# Patient Record
Sex: MALE | Race: WHITE | NOT HISPANIC OR LATINO | URBAN - METROPOLITAN AREA
[De-identification: names, ages, dates, MRNs, and addresses within clinical notes are randomized per-mention and may not be internally consistent; named-entity substitution may affect disease eponyms.]

---

## 2020-08-04 ENCOUNTER — OFFICE VISIT (OUTPATIENT)
Dept: URBAN - METROPOLITAN AREA CLINIC 40 | Facility: CLINIC | Age: 80
End: 2020-08-04

## 2020-08-06 ENCOUNTER — WEB ENCOUNTER (OUTPATIENT)
Dept: URBAN - METROPOLITAN AREA CLINIC 40 | Facility: CLINIC | Age: 80
End: 2020-08-06

## 2020-08-06 ENCOUNTER — OFFICE VISIT (OUTPATIENT)
Dept: URBAN - METROPOLITAN AREA TELEHEALTH 2 | Facility: TELEHEALTH | Age: 80
End: 2020-08-06
Payer: MEDICARE

## 2020-08-06 DIAGNOSIS — K29.70 GASTRITIS: ICD-10-CM

## 2020-08-06 DIAGNOSIS — K21.9 GERD (GASTROESOPHAGEAL REFLUX DISEASE): ICD-10-CM

## 2020-08-06 DIAGNOSIS — R13.14 ESOPHAGEAL DYSPHAGIA: ICD-10-CM

## 2020-08-06 DIAGNOSIS — R14.0 BLOATING: ICD-10-CM

## 2020-08-06 DIAGNOSIS — K29.60 ADENOPAPILLOMATOSIS GASTRICA: ICD-10-CM

## 2020-08-06 DIAGNOSIS — R10.13 EPIGASTRIC PAIN: ICD-10-CM

## 2020-08-06 PROCEDURE — G8417 CALC BMI ABV UP PARAM F/U: HCPCS | Performed by: INTERNAL MEDICINE

## 2020-08-06 PROCEDURE — G8427 DOCREV CUR MEDS BY ELIG CLIN: HCPCS | Performed by: INTERNAL MEDICINE

## 2020-08-06 PROCEDURE — 1036F TOBACCO NON-USER: CPT | Performed by: INTERNAL MEDICINE

## 2020-08-06 PROCEDURE — 99213 OFFICE O/P EST LOW 20 MIN: CPT | Performed by: INTERNAL MEDICINE

## 2020-08-06 PROCEDURE — G9903 PT SCRN TBCO ID AS NON USER: HCPCS | Performed by: INTERNAL MEDICINE

## 2020-08-06 RX ORDER — ESOMEPRAZOLE MAGNESIUM 40 MG
TAKE 1 CAPSULE BY ORAL ROUTE DAILY FOR 90 DAYS CAPSULE,DELAYED RELEASE (ENTERIC COATED) ORAL 1
Qty: 90 | Refills: 3 | Status: ACTIVE | COMMUNITY
Start: 2019-10-25 | End: 2020-10-19

## 2020-08-06 RX ORDER — ESOMEPRAZOLE MAGNESIUM 40 MG
TAKE 1 CAPSULE BY ORAL ROUTE DAILY FOR 90 DAYS CAPSULE,DELAYED RELEASE (ENTERIC COATED) ORAL 1
Qty: 90 | Refills: 3
Start: 2019-10-25

## 2020-08-06 NOTE — HPI-TODAY'S VISIT:
Patient is feeling well on daily Nexium 20 mg. Had one recent episode of dysphagia to solid food, none since. Last EGD was 11/26/19, showing mild gastritis, dilated to 48F Marrero.. Normal Abdominal ultrasound. He does have pacemaker, on Xarelto.

## 2022-05-17 ENCOUNTER — LAB OUTSIDE AN ENCOUNTER (OUTPATIENT)
Dept: URBAN - METROPOLITAN AREA CLINIC 40 | Facility: CLINIC | Age: 82
End: 2022-05-17

## 2022-05-17 ENCOUNTER — OFFICE VISIT (OUTPATIENT)
Dept: URBAN - METROPOLITAN AREA CLINIC 40 | Facility: CLINIC | Age: 82
End: 2022-05-17
Payer: MEDICARE

## 2022-05-17 DIAGNOSIS — R13.14 ESOPHAGEAL DYSPHAGIA: ICD-10-CM

## 2022-05-17 DIAGNOSIS — K29.70 GASTRITIS: ICD-10-CM

## 2022-05-17 DIAGNOSIS — K21.9 GERD (GASTROESOPHAGEAL REFLUX DISEASE): ICD-10-CM

## 2022-05-17 DIAGNOSIS — D50.9 IRON DEFICIENCY ANEMIA, UNSPECIFIED IRON DEFICIENCY ANEMIA TYPE: ICD-10-CM

## 2022-05-17 DIAGNOSIS — R14.0 BLOATING: ICD-10-CM

## 2022-05-17 PROCEDURE — 99214 OFFICE O/P EST MOD 30 MIN: CPT | Performed by: INTERNAL MEDICINE

## 2022-05-17 RX ORDER — ESOMEPRAZOLE MAGNESIUM 40 MG
TAKE 1 CAPSULE BY ORAL ROUTE DAILY FOR 90 DAYS CAPSULE,DELAYED RELEASE (ENTERIC COATED) ORAL 1
Qty: 90 | Refills: 3 | Status: ACTIVE | COMMUNITY
Start: 2019-10-25

## 2022-05-17 RX ORDER — OLMESARTAN MEDOXOMIL 5 MG
AS DIRECTED TABLET ORAL
Status: ACTIVE | COMMUNITY

## 2022-05-17 NOTE — HPI-TODAY'S VISIT:
Patient is feeling well on daily Nexium 20 mg. Had one recent episode of dysphagia to solid food, none since. Last EGD was 11/26/19, showing mild gastritis, dilated to 48F Marrero.. Normal Abdominal ultrasound. He does have pacemaker, on Xarelto. Patient underwent his last colonoscopy on 8/4/2014 which was a screening exam sigmoid diverticulosis was found, the exam was otherwise unremarkable. Patient underwent lab work on 4/20/2022.  Serum ferritin was 18 serum iron was 50 TIBC 416 with a 12% saturation hematocrit 37.2 hemoglobin 12.6 WBC 5.7 Patient returns for follow-up on 5/17/2022.  He is again complaining of dysphagia to solid food, with food temporarily lodging in his retrosternal region.  He underwent his last EGD with esophageal dilation on 11/26/2019.  He denies any abdominal pain, his stools have been normal in consistency has had no unexplained weight loss recent lab work does show iron deficiency anemia with serum ferritin of 18 serum iron 50 TIBC 416 with a 12% saturation.  Hematocrit 37.2 hemoglobin 12.6.  Stool testing was Hemoccult negative.  Patient underwent his last screening colonoscopy on 8/4/2014 which showed only sigmoid diverticulosis.  He has been taking over-the-counter Nexium 20 mg daily which has been well controlling his reflux.  He does have a pacemaker and is on Xarelto, so procedures will need to be done at the hospital, and we will need to obtain cardiac clearance.

## 2022-08-02 ENCOUNTER — OFFICE VISIT (OUTPATIENT)
Dept: URBAN - METROPOLITAN AREA MEDICAL CENTER 9 | Facility: MEDICAL CENTER | Age: 82
End: 2022-08-02
Payer: MEDICARE

## 2022-08-02 DIAGNOSIS — Z12.11 COLON CANCER SCREENING: ICD-10-CM

## 2022-08-02 DIAGNOSIS — K22.89 DILATATION OF ESOPHAGUS: ICD-10-CM

## 2022-08-02 DIAGNOSIS — D12.5 ADENOMA OF SIGMOID COLON: ICD-10-CM

## 2022-08-02 DIAGNOSIS — R13.19 CERVICAL DYSPHAGIA: ICD-10-CM

## 2022-08-02 DIAGNOSIS — K29.50 ANTRAL GASTRITIS: ICD-10-CM

## 2022-08-02 DIAGNOSIS — K31.89 ACQUIRED DEFORMITY OF DUODENUM: ICD-10-CM

## 2022-08-02 DIAGNOSIS — D12.3 ADENOMA OF TRANSVERSE COLON: ICD-10-CM

## 2022-08-02 PROCEDURE — 43239 EGD BIOPSY SINGLE/MULTIPLE: CPT | Performed by: INTERNAL MEDICINE

## 2022-08-02 PROCEDURE — 45380 COLONOSCOPY AND BIOPSY: CPT | Performed by: INTERNAL MEDICINE

## 2022-08-02 PROCEDURE — 43249 ESOPH EGD DILATION <30 MM: CPT | Performed by: INTERNAL MEDICINE

## 2022-08-23 ENCOUNTER — OFFICE VISIT (OUTPATIENT)
Dept: URBAN - METROPOLITAN AREA CLINIC 40 | Facility: CLINIC | Age: 82
End: 2022-08-23
Payer: MEDICARE

## 2022-08-23 ENCOUNTER — OFFICE VISIT (OUTPATIENT)
Dept: URBAN - METROPOLITAN AREA CLINIC 40 | Facility: CLINIC | Age: 82
End: 2022-08-23

## 2022-08-23 VITALS
HEIGHT: 66 IN | WEIGHT: 194.8 LBS | BODY MASS INDEX: 31.31 KG/M2 | DIASTOLIC BLOOD PRESSURE: 72 MMHG | TEMPERATURE: 97.5 F | SYSTOLIC BLOOD PRESSURE: 142 MMHG | HEART RATE: 92 BPM

## 2022-08-23 DIAGNOSIS — D50.9 IRON DEFICIENCY ANEMIA, UNSPECIFIED IRON DEFICIENCY ANEMIA TYPE: ICD-10-CM

## 2022-08-23 DIAGNOSIS — D12.6 TUBULAR ADENOMA OF COLON: ICD-10-CM

## 2022-08-23 DIAGNOSIS — R13.14 ESOPHAGEAL DYSPHAGIA: ICD-10-CM

## 2022-08-23 DIAGNOSIS — K21.9 GERD (GASTROESOPHAGEAL REFLUX DISEASE): ICD-10-CM

## 2022-08-23 DIAGNOSIS — K29.70 GASTRITIS: ICD-10-CM

## 2022-08-23 DIAGNOSIS — R14.0 BLOATING: ICD-10-CM

## 2022-08-23 PROBLEM — 444898006: Status: ACTIVE | Noted: 2022-08-23

## 2022-08-23 PROCEDURE — 99213 OFFICE O/P EST LOW 20 MIN: CPT | Performed by: INTERNAL MEDICINE

## 2022-08-23 RX ORDER — OLMESARTAN MEDOXOMIL 5 MG
AS DIRECTED TABLET ORAL
Status: ACTIVE | COMMUNITY

## 2022-08-23 RX ORDER — ESOMEPRAZOLE MAGNESIUM 40 MG
TAKE 1 CAPSULE BY ORAL ROUTE DAILY FOR 90 DAYS CAPSULE,DELAYED RELEASE (ENTERIC COATED) ORAL 1
Qty: 90 | Refills: 3 | Status: ACTIVE | COMMUNITY
Start: 2019-10-25

## 2022-08-23 RX ORDER — ESOMEPRAZOLE MAGNESIUM 40 MG/1
1 CAPSULE CAPSULE, DELAYED RELEASE ORAL ONCE A DAY
Qty: 90 CAPSULE | Refills: 3 | OUTPATIENT
Start: 2022-08-23

## 2022-08-23 NOTE — HPI-TODAY'S VISIT:
Patient is feeling well on daily Nexium 20 mg. Had one recent episode of dysphagia to solid food, none since. Last EGD was 11/26/19, showing mild gastritis, dilated to 48F Marrero.. Normal Abdominal ultrasound. He does have pacemaker, on Xarelto. Patient underwent his last colonoscopy on 8/4/2014 which was a screening exam sigmoid diverticulosis was found, the exam was otherwise unremarkable. Patient underwent lab work on 4/20/2022.  Serum ferritin was 18 serum iron was 50 TIBC 416 with a 12% saturation hematocrit 37.2 hemoglobin 12.6 WBC 5.7 Patient returns for follow-up on 5/17/2022.  He is again complaining of dysphagia to solid food, with food temporarily lodging in his retrosternal region.  He underwent his last EGD with esophageal dilation on 11/26/2019.  He denies any abdominal pain, his stools have been normal in consistency has had no unexplained weight loss recent lab work does show iron deficiency anemia with serum ferritin of 18 serum iron 50 TIBC 416 with a 12% saturation.  Hematocrit 37.2 hemoglobin 12.6.  Stool testing was Hemoccult negative.  Patient underwent his last screening colonoscopy on 8/4/2014 which showed only sigmoid diverticulosis.  He has been taking over-the-counter Nexium 20 mg daily which has been well controlling his reflux.  He does have a pacemaker and is on Xarelto, so procedures will need to be done at the hospital, and we will need to obtain cardiac clearance. Patient underwent colonoscopy and EGD by Dr. Liu on 8/2/2022.  The colonoscopy revealed a 5 mm polyp in the transverse colon which was removed and a 6 mm polyp in the sigmoid colon also removed with biopsy forceps.  Few left colonic diverticuli were seen and small internal hemorrhoids.  The pathology of the polyps were tubular adenoma.  Patient's EGD revealed grade B distal esophagitis biopsies showed benign inflammation TTS balloon dilator was passed to 20 mm mild gastritis was present biopsy showed benign inflammation duodenal biopsies were unremarkable. Patient returns for follow-up on 8/23/2022.  He has done well since his colonoscopy and EGD.  I carefully reviewed the findings with him, and following his esophageal dilation, he is now swallowing well he believes his reflux will be better controlled on prescription strength omeprazole 40 mg daily, rather than over-the-counter Nexium which she is now doing.  He denies any abdominal pain, his bowel movements have been normal he has had no unexplained weight loss and no overt GI bleeding as to adenomatous polyps were removed on his recent colonoscopy, due to his age, I believe we should schedule follow-up colonoscopy for surveillance in 3 years, assuming he is still in good health.  We will see him back in follow-up in 6 months.

## 2023-02-15 ENCOUNTER — WEB ENCOUNTER (OUTPATIENT)
Dept: URBAN - METROPOLITAN AREA CLINIC 40 | Facility: CLINIC | Age: 83
End: 2023-02-15

## 2023-02-18 PROBLEM — 87522002: Status: ACTIVE | Noted: 2022-05-15

## 2023-02-21 ENCOUNTER — OFFICE VISIT (OUTPATIENT)
Dept: URBAN - METROPOLITAN AREA CLINIC 40 | Facility: CLINIC | Age: 83
End: 2023-02-21
Payer: MEDICARE

## 2023-02-21 VITALS
DIASTOLIC BLOOD PRESSURE: 70 MMHG | WEIGHT: 194 LBS | SYSTOLIC BLOOD PRESSURE: 130 MMHG | HEIGHT: 66 IN | BODY MASS INDEX: 31.18 KG/M2 | HEART RATE: 93 BPM | TEMPERATURE: 97.3 F

## 2023-02-21 DIAGNOSIS — K21.9 GERD (GASTROESOPHAGEAL REFLUX DISEASE): ICD-10-CM

## 2023-02-21 DIAGNOSIS — D12.6 TUBULAR ADENOMA OF COLON: ICD-10-CM

## 2023-02-21 DIAGNOSIS — R14.0 BLOATING: ICD-10-CM

## 2023-02-21 DIAGNOSIS — K29.70 GASTRITIS: ICD-10-CM

## 2023-02-21 DIAGNOSIS — R13.14 ESOPHAGEAL DYSPHAGIA: ICD-10-CM

## 2023-02-21 DIAGNOSIS — D50.9 IRON DEFICIENCY ANEMIA, UNSPECIFIED IRON DEFICIENCY ANEMIA TYPE: ICD-10-CM

## 2023-02-21 PROCEDURE — 99213 OFFICE O/P EST LOW 20 MIN: CPT | Performed by: INTERNAL MEDICINE

## 2023-02-21 RX ORDER — ESOMEPRAZOLE MAGNESIUM 40 MG
TAKE 1 CAPSULE BY ORAL ROUTE DAILY FOR 90 DAYS CAPSULE,DELAYED RELEASE (ENTERIC COATED) ORAL 1
Qty: 90 | Refills: 3 | Status: ACTIVE | COMMUNITY
Start: 2019-10-25

## 2023-02-21 RX ORDER — ESOMEPRAZOLE MAGNESIUM 40 MG/1
1 CAPSULE CAPSULE, DELAYED RELEASE ORAL ONCE A DAY
Qty: 90 CAPSULE | Refills: 3 | Status: ACTIVE | COMMUNITY
Start: 2022-08-23

## 2023-02-21 RX ORDER — ESOMEPRAZOLE MAGNESIUM 40 MG/1
1 CAPSULE CAPSULE, DELAYED RELEASE ORAL ONCE A DAY
Qty: 90 CAPSULE | Refills: 3 | OUTPATIENT

## 2023-02-21 RX ORDER — OLMESARTAN MEDOXOMIL 5 MG
AS DIRECTED TABLET ORAL
Status: ACTIVE | COMMUNITY

## 2023-02-21 NOTE — HPI-TODAY'S VISIT:
Patient is feeling well on daily Nexium 20 mg. Had one recent episode of dysphagia to solid food, none since. Last EGD was 11/26/19, showing mild gastritis, dilated to 48F Marrero.. Normal Abdominal ultrasound. He does have pacemaker, on Xarelto. Patient underwent his last colonoscopy on 8/4/2014 which was a screening exam sigmoid diverticulosis was found, the exam was otherwise unremarkable. Patient underwent lab work on 4/20/2022.  Serum ferritin was 18 serum iron was 50 TIBC 416 with a 12% saturation hematocrit 37.2 hemoglobin 12.6 WBC 5.7 Patient returns for follow-up on 5/17/2022.  He is again complaining of dysphagia to solid food, with food temporarily lodging in his retrosternal region.  He underwent his last EGD with esophageal dilation on 11/26/2019.  He denies any abdominal pain, his stools have been normal in consistency has had no unexplained weight loss recent lab work does show iron deficiency anemia with serum ferritin of 18 serum iron 50 TIBC 416 with a 12% saturation.  Hematocrit 37.2 hemoglobin 12.6.  Stool testing was Hemoccult negative.  Patient underwent his last screening colonoscopy on 8/4/2014 which showed only sigmoid diverticulosis.  He has been taking over-the-counter Nexium 20 mg daily which has been well controlling his reflux.  He does have a pacemaker and is on Xarelto, so procedures will need to be done at the hospital, and we will need to obtain cardiac clearance. Patient underwent colonoscopy and EGD by Dr. Liu on 8/2/2022.  The colonoscopy revealed a 5 mm polyp in the transverse colon which was removed and a 6 mm polyp in the sigmoid colon, also removed few diverticuli were seen, grade 1 internal hemorrhoids were noted.  The pathology of both these polyps were tubular adenoma.  Patient underwent EGD which showed grade B esophagitis which was biopsied a TTS balloon dilator 18 to 20 mm was passed diffuse mild gastritis was seen also biopsied duodenal biopsies were taken to evaluate for celiac disease.  Pathology revealed benign duodenal biopsies gastric biopsies showed mild gastritis distal esophageal biopsies revealed benign squamous mucosa. Patient underwent lab work on 1/24/2023 which included normal CBC with hematocrit 43.5 hemoglobin 14.3 WBC 6.1 and an essentially normal CMP. Patient is feeling well on daily Nexium 20 mg. Had one recent episode of dysphagia to solid food, none since. Last EGD was 11/26/19, showing mild gastritis, dilated to 48F Marrero.. Normal Abdominal ultrasound. He does have pacemaker, on Xarelto. Patient underwent his last colonoscopy on 8/4/2014 which was a screening exam sigmoid diverticulosis was found, the exam was otherwise unremarkable. Patient underwent lab work on 4/20/2022.  Serum ferritin was 18 serum iron was 50 TIBC 416 with a 12% saturation hematocrit 37.2 hemoglobin 12.6 WBC 5.7 Patient returns for follow-up on 5/17/2022.  He is again complaining of dysphagia to solid food, with food temporarily lodging in his retrosternal region.  He underwent his last EGD with esophageal dilation on 11/26/2019.  He denies any abdominal pain, his stools have been normal in consistency has had no unexplained weight loss recent lab work does show iron deficiency anemia with serum ferritin of 18 serum iron 50 TIBC 416 with a 12% saturation.  Hematocrit 37.2 hemoglobin 12.6.  Stool testing was Hemoccult negative.  Patient underwent his last screening colonoscopy on 8/4/2014 which showed only sigmoid diverticulosis.  He has been taking over-the-counter Nexium 20 mg daily which has been well controlling his reflux.  He does have a pacemaker and is on Xarelto, so procedures will need to be done at the hospital, and we will need to obtain cardiac clearance. Patient underwent colonoscopy and EGD by Dr. Liu on 8/2/2022.  The colonoscopy revealed a 5 mm polyp in the transverse colon which was removed and a 6 mm polyp in the sigmoid colon also removed with biopsy forceps.  Few left colonic diverticuli were seen and small internal hemorrhoids.  The pathology of the polyps were tubular adenoma.  Patient's EGD revealed grade B distal esophagitis biopsies showed benign inflammation TTS balloon dilator was passed to 20 mm mild gastritis was present biopsy showed benign inflammation duodenal biopsies were unremarkable. Patient returns for follow-up on 8/23/2022.  He has done well since his colonoscopy and EGD.  I carefully reviewed the findings with him, and following his esophageal dilation, he is now swallowing well he believes his reflux will be better controlled on prescription strength omeprazole 40 mg daily, rather than over-the-counter Nexium which she is now doing.  He denies any abdominal pain, his bowel movements have been normal he has had no unexplained weight loss and no overt GI bleeding as to adenomatous polyps were removed on his recent colonoscopy, due to his age, I believe we should schedule follow-up colonoscopy for surveillance in 3 years, assuming he is still in good health.  We will see him back in follow-up in 6 months. Patient returns for follow-up on 2/21/2023.  Overall he is doing very well from a GI standpoint.  He is having no significant swallowing issues, and his reflux has been well controlled on 20 mg daily of esomeprazole, and on occasion he will increase this dosing to 40 mg.  He denies abdominal pain and has had no unexplained weight loss he eats well with a good appetite, his bowel movements have been normal.  Recent lab work did show hematocrit 43.5 hemoglobin 14.3, as his iron deficiency anemia appears to have resolved.  We again discussed his last colonoscopy with 2 adenomatous polyps were removed.  Because of his age, would plan follow-up surveillance colonoscopy in 3 years, assuming at that time he is healthy enough to undergo the procedure.

## 2023-08-20 PROBLEM — 307496006: Status: ACTIVE | Noted: 2023-08-20

## 2023-08-20 PROBLEM — 428283002: Status: ACTIVE | Noted: 2023-08-20

## 2023-08-22 ENCOUNTER — OFFICE VISIT (OUTPATIENT)
Dept: URBAN - METROPOLITAN AREA CLINIC 40 | Facility: CLINIC | Age: 83
End: 2023-08-22
Payer: MEDICARE

## 2023-08-22 VITALS
HEART RATE: 69 BPM | HEIGHT: 66 IN | TEMPERATURE: 96.3 F | WEIGHT: 198.6 LBS | DIASTOLIC BLOOD PRESSURE: 74 MMHG | SYSTOLIC BLOOD PRESSURE: 120 MMHG | BODY MASS INDEX: 31.92 KG/M2

## 2023-08-22 DIAGNOSIS — K57.92 DIVERTICULITIS: ICD-10-CM

## 2023-08-22 DIAGNOSIS — K29.70 GASTRITIS: ICD-10-CM

## 2023-08-22 DIAGNOSIS — K21.9 GERD (GASTROESOPHAGEAL REFLUX DISEASE): ICD-10-CM

## 2023-08-22 DIAGNOSIS — Z86.010 HISTORY OF COLON POLYPS: ICD-10-CM

## 2023-08-22 DIAGNOSIS — K62.5 RECTAL BLEEDING: ICD-10-CM

## 2023-08-22 DIAGNOSIS — D50.9 IRON DEFICIENCY ANEMIA, UNSPECIFIED IRON DEFICIENCY ANEMIA TYPE: ICD-10-CM

## 2023-08-22 DIAGNOSIS — R14.0 BLOATING: ICD-10-CM

## 2023-08-22 DIAGNOSIS — R13.14 ESOPHAGEAL DYSPHAGIA: ICD-10-CM

## 2023-08-22 PROCEDURE — 99213 OFFICE O/P EST LOW 20 MIN: CPT | Performed by: INTERNAL MEDICINE

## 2023-08-22 RX ORDER — ESOMEPRAZOLE MAGNESIUM 40 MG/1
1 CAPSULE CAPSULE, DELAYED RELEASE ORAL ONCE A DAY
Qty: 90 CAPSULE | Refills: 3 | Status: ACTIVE | COMMUNITY

## 2023-08-22 RX ORDER — ESOMEPRAZOLE MAGNESIUM 40 MG/1
1 CAPSULE CAPSULE, DELAYED RELEASE ORAL ONCE A DAY
Qty: 90 CAPSULE | Refills: 3 | OUTPATIENT

## 2023-08-22 RX ORDER — ESOMEPRAZOLE MAGNESIUM 40 MG
TAKE 1 CAPSULE BY ORAL ROUTE DAILY FOR 90 DAYS CAPSULE,DELAYED RELEASE (ENTERIC COATED) ORAL 1
Qty: 90 | Refills: 3 | Status: ACTIVE | COMMUNITY
Start: 2019-10-25

## 2023-08-22 RX ORDER — OLMESARTAN MEDOXOMIL 5 MG
AS DIRECTED TABLET ORAL
Status: ACTIVE | COMMUNITY

## 2023-08-22 NOTE — HPI-TODAY'S VISIT:
Patient is feeling well on daily Nexium 20 mg. Had one recent episode of dysphagia to solid food, none since. Last EGD was 11/26/19, showing mild gastritis, dilated to 48F Marrero.. Normal Abdominal ultrasound. He does have pacemaker, on Xarelto. Patient underwent his last colonoscopy on 8/4/2014 which was a screening exam sigmoid diverticulosis was found, the exam was otherwise unremarkable. Patient underwent lab work on 4/20/2022.  Serum ferritin was 18 serum iron was 50 TIBC 416 with a 12% saturation hematocrit 37.2 hemoglobin 12.6 WBC 5.7 Patient returns for follow-up on 5/17/2022.  He is again complaining of dysphagia to solid food, with food temporarily lodging in his retrosternal region.  He underwent his last EGD with esophageal dilation on 11/26/2019.  He denies any abdominal pain, his stools have been normal in consistency has had no unexplained weight loss recent lab work does show iron deficiency anemia with serum ferritin of 18 serum iron 50 TIBC 416 with a 12% saturation.  Hematocrit 37.2 hemoglobin 12.6.  Stool testing was Hemoccult negative.  Patient underwent his last screening colonoscopy on 8/4/2014 which showed only sigmoid diverticulosis.  He has been taking over-the-counter Nexium 20 mg daily which has been well controlling his reflux.  He does have a pacemaker and is on Xarelto, so procedures will need to be done at the hospital, and we will need to obtain cardiac clearance. Patient underwent colonoscopy and EGD by Dr. Liu on 8/2/2022.  The colonoscopy revealed a 5 mm polyp in the transverse colon which was removed and a 6 mm polyp in the sigmoid colon, also removed few diverticuli were seen, grade 1 internal hemorrhoids were noted.  The pathology of both these polyps were tubular adenoma.  Patient underwent EGD which showed grade B esophagitis which was biopsied a TTS balloon dilator 18 to 20 mm was passed diffuse mild gastritis was seen also biopsied duodenal biopsies were taken to evaluate for celiac disease.  Pathology revealed benign duodenal biopsies gastric biopsies showed mild gastritis distal esophageal biopsies revealed benign squamous mucosa. Patient underwent lab work on 1/24/2023 which included normal CBC with hematocrit 43.5 hemoglobin 14.3 WBC 6.1 and an essentially normal CMP. Patient is feeling well on daily Nexium 20 mg. Had one recent episode of dysphagia to solid food, none since. Last EGD was 11/26/19, showing mild gastritis, dilated to 48F Marrero.. Normal Abdominal ultrasound. He does have pacemaker, on Xarelto. Patient underwent his last colonoscopy on 8/4/2014 which was a screening exam sigmoid diverticulosis was found, the exam was otherwise unremarkable. Patient underwent lab work on 4/20/2022.  Serum ferritin was 18 serum iron was 50 TIBC 416 with a 12% saturation hematocrit 37.2 hemoglobin 12.6 WBC 5.7 Patient returns for follow-up on 5/17/2022.  He is again complaining of dysphagia to solid food, with food temporarily lodging in his retrosternal region.  He underwent his last EGD with esophageal dilation on 11/26/2019.  He denies any abdominal pain, his stools have been normal in consistency has had no unexplained weight loss recent lab work does show iron deficiency anemia with serum ferritin of 18 serum iron 50 TIBC 416 with a 12% saturation.  Hematocrit 37.2 hemoglobin 12.6.  Stool testing was Hemoccult negative.  Patient underwent his last screening colonoscopy on 8/4/2014 which showed only sigmoid diverticulosis.  He has been taking over-the-counter Nexium 20 mg daily which has been well controlling his reflux.  He does have a pacemaker and is on Xarelto, so procedures will need to be done at the hospital, and we will need to obtain cardiac clearance. Patient underwent colonoscopy and EGD by Dr. Liu on 8/2/2022.  The colonoscopy revealed a 5 mm polyp in the transverse colon which was removed and a 6 mm polyp in the sigmoid colon also removed with biopsy forceps.  Few left colonic diverticuli were seen and small internal hemorrhoids.  The pathology of the polyps were tubular adenoma.  Patient's EGD revealed grade B distal esophagitis biopsies showed benign inflammation TTS balloon dilator was passed to 20 mm mild gastritis was present biopsy showed benign inflammation duodenal biopsies were unremarkable. Patient returns for follow-up on 8/23/2022.  He has done well since his colonoscopy and EGD.  I carefully reviewed the findings with him, and following his esophageal dilation, he is now swallowing well he believes his reflux will be better controlled on prescription strength omeprazole 40 mg daily, rather than over-the-counter Nexium which she is now doing.  He denies any abdominal pain, his bowel movements have been normal he has had no unexplained weight loss and no overt GI bleeding as to adenomatous polyps were removed on his recent colonoscopy, due to his age, I believe we should schedule follow-up colonoscopy for surveillance in 3 years, assuming he is still in good health.  We will see him back in follow-up in 6 months. Patient returns for follow-up on 2/21/2023.  Overall he is doing very well from a GI standpoint.  He is having no significant swallowing issues, and his reflux has been well controlled on 20 mg daily of esomeprazole, and on occasion he will increase this dosing to 40 mg.  He denies abdominal pain and has had no unexplained weight loss he eats well with a good appetite, his bowel movements have been normal.  Recent lab work did show hematocrit 43.5 hemoglobin 14.3, as his iron deficiency anemia appears to have resolved.  We again discussed his last colonoscopy with 2 adenomatous polyps were removed.  Because of his age, would plan follow-up surveillance colonoscopy in 3 years, assuming at that time he is healthy enough to undergo the procedure. Patient presented in May 2023 with lower abdominal pain he underwent abdominal pelvic CT scan which was consistent with a mild sigmoid diverticulitis.  His amylase lipase CBC and CMP were all essentially within normal limits.  He subsequently improved, although experienced 1 episode of rectal bleeding. Patient returns for follow-up on 8/22/2023.  We discussed in detail his episode of mild diverticulitis that occurred in May 2023.  He did receive 2 rounds of antibiotic therapy, and his symptoms have completely resolved having no further abdominal pain and his bowel movements have been normal.  He has added Metamucil to his regimen, and has been drinking plenty of fluids.  He does experience intermittent lower abdominal discomfort which lasts only a very short time and resolves.  This is more consistent with a colonic spasm.  As it is short acting, there is no needed present to prescribe antispasmodic therapy.  He is due for follow-up colonoscopy in 3 years, with his history of adenomatous colon polyps.  He is taking daily esomeprazole 40 mg, which is controlling his reflux he is having some issues with mild dysphagia, but believes he is not yet needing another esophageal dilation.

## 2023-10-01 ENCOUNTER — ERX REFILL RESPONSE (OUTPATIENT)
Dept: URBAN - METROPOLITAN AREA CLINIC 40 | Facility: CLINIC | Age: 83
End: 2023-10-01

## 2023-10-01 RX ORDER — ESOMEPRAZOLE MAGNESIUM 40 MG/1
TAKE 1 CAPSULE BY MOUTH EVERY DAY CAPSULE, DELAYED RELEASE ORAL
Qty: 90 CAPSULE | Refills: 3 | OUTPATIENT

## 2023-10-01 RX ORDER — ESOMEPRAZOLE MAGNESIUM 40 MG/1
1 CAPSULE CAPSULE, DELAYED RELEASE ORAL ONCE A DAY
Qty: 90 CAPSULE | Refills: 3 | OUTPATIENT

## 2024-02-13 ENCOUNTER — OV EP (OUTPATIENT)
Dept: URBAN - METROPOLITAN AREA CLINIC 40 | Facility: CLINIC | Age: 84
End: 2024-02-13
Payer: MEDICARE

## 2024-02-13 ENCOUNTER — LAB (OUTPATIENT)
Dept: URBAN - METROPOLITAN AREA CLINIC 40 | Facility: CLINIC | Age: 84
End: 2024-02-13

## 2024-02-13 VITALS
DIASTOLIC BLOOD PRESSURE: 70 MMHG | WEIGHT: 194 LBS | BODY MASS INDEX: 31.18 KG/M2 | TEMPERATURE: 97.4 F | HEIGHT: 66 IN | HEART RATE: 70 BPM | SYSTOLIC BLOOD PRESSURE: 134 MMHG

## 2024-02-13 DIAGNOSIS — R14.0 BLOATING: ICD-10-CM

## 2024-02-13 DIAGNOSIS — K29.70 GASTRITIS: ICD-10-CM

## 2024-02-13 DIAGNOSIS — R13.14 ESOPHAGEAL DYSPHAGIA: ICD-10-CM

## 2024-02-13 DIAGNOSIS — D50.9 IRON DEFICIENCY ANEMIA, UNSPECIFIED IRON DEFICIENCY ANEMIA TYPE: ICD-10-CM

## 2024-02-13 DIAGNOSIS — Z86.010 HISTORY OF COLON POLYPS: ICD-10-CM

## 2024-02-13 DIAGNOSIS — K57.92 DIVERTICULITIS: ICD-10-CM

## 2024-02-13 DIAGNOSIS — K21.9 GERD (GASTROESOPHAGEAL REFLUX DISEASE): ICD-10-CM

## 2024-02-13 PROCEDURE — 99214 OFFICE O/P EST MOD 30 MIN: CPT | Performed by: INTERNAL MEDICINE

## 2024-02-13 RX ORDER — OLMESARTAN MEDOXOMIL 5 MG
AS DIRECTED TABLET ORAL
Status: ACTIVE | COMMUNITY

## 2024-02-13 RX ORDER — ESOMEPRAZOLE MAGNESIUM 40 MG
TAKE 1 CAPSULE BY ORAL ROUTE DAILY FOR 90 DAYS CAPSULE,DELAYED RELEASE (ENTERIC COATED) ORAL 1
Qty: 90 | Refills: 3 | Status: ACTIVE | COMMUNITY
Start: 2019-10-25

## 2024-02-13 RX ORDER — ESOMEPRAZOLE MAGNESIUM 40 MG/1
TAKE 1 CAPSULE BY MOUTH EVERY DAY CAPSULE, DELAYED RELEASE ORAL
Qty: 90 CAPSULE | Refills: 3 | Status: ACTIVE | COMMUNITY

## 2024-02-13 RX ORDER — ESOMEPRAZOLE MAGNESIUM 40 MG/1
1 CAPSULE CAPSULE, DELAYED RELEASE ORAL ONCE A DAY
Qty: 90 CAPSULE | Refills: 3 | OUTPATIENT

## 2024-02-13 NOTE — HPI-TODAY'S VISIT:
Patient is feeling well on daily Nexium 20 mg. Had one recent episode of dysphagia to solid food, none since. Last EGD was 11/26/19, showing mild gastritis, dilated to 48F Marrero.. Normal Abdominal ultrasound. He does have pacemaker, on Xarelto. Patient underwent his last colonoscopy on 8/4/2014 which was a screening exam sigmoid diverticulosis was found, the exam was otherwise unremarkable. Patient underwent lab work on 4/20/2022.  Serum ferritin was 18 serum iron was 50 TIBC 416 with a 12% saturation hematocrit 37.2 hemoglobin 12.6 WBC 5.7 Patient returns for follow-up on 5/17/2022.  He is again complaining of dysphagia to solid food, with food temporarily lodging in his retrosternal region.  He underwent his last EGD with esophageal dilation on 11/26/2019.  He denies any abdominal pain, his stools have been normal in consistency has had no unexplained weight loss recent lab work does show iron deficiency anemia with serum ferritin of 18 serum iron 50 TIBC 416 with a 12% saturation.  Hematocrit 37.2 hemoglobin 12.6.  Stool testing was Hemoccult negative.  Patient underwent his last screening colonoscopy on 8/4/2014 which showed only sigmoid diverticulosis.  He has been taking over-the-counter Nexium 20 mg daily which has been well controlling his reflux.  He does have a pacemaker and is on Xarelto, so procedures will need to be done at the hospital, and we will need to obtain cardiac clearance. Patient underwent colonoscopy and EGD by Dr. Liu on 8/2/2022.  The colonoscopy revealed a 5 mm polyp in the transverse colon which was removed and a 6 mm polyp in the sigmoid colon, also removed few diverticuli were seen, grade 1 internal hemorrhoids were noted.  The pathology of both these polyps were tubular adenoma.  Patient underwent EGD which showed grade B esophagitis which was biopsied a TTS balloon dilator 18 to 20 mm was passed diffuse mild gastritis was seen also biopsied duodenal biopsies were taken to evaluate for celiac disease.  Pathology revealed benign duodenal biopsies gastric biopsies showed mild gastritis distal esophageal biopsies revealed benign squamous mucosa. Patient underwent lab work on 1/24/2023 which included normal CBC with hematocrit 43.5 hemoglobin 14.3 WBC 6.1 and an essentially normal CMP. Patient is feeling well on daily Nexium 20 mg. Had one recent episode of dysphagia to solid food, none since. Last EGD was 11/26/19, showing mild gastritis, dilated to 48F Marrero.. Normal Abdominal ultrasound. He does have pacemaker, on Xarelto. Patient underwent his last colonoscopy on 8/4/2014 which was a screening exam sigmoid diverticulosis was found, the exam was otherwise unremarkable. Patient underwent lab work on 4/20/2022.  Serum ferritin was 18 serum iron was 50 TIBC 416 with a 12% saturation hematocrit 37.2 hemoglobin 12.6 WBC 5.7 Patient returns for follow-up on 5/17/2022.  He is again complaining of dysphagia to solid food, with food temporarily lodging in his retrosternal region.  He underwent his last EGD with esophageal dilation on 11/26/2019.  He denies any abdominal pain, his stools have been normal in consistency has had no unexplained weight loss recent lab work does show iron deficiency anemia with serum ferritin of 18 serum iron 50 TIBC 416 with a 12% saturation.  Hematocrit 37.2 hemoglobin 12.6.  Stool testing was Hemoccult negative.  Patient underwent his last screening colonoscopy on 8/4/2014 which showed only sigmoid diverticulosis.  He has been taking over-the-counter Nexium 20 mg daily which has been well controlling his reflux.  He does have a pacemaker and is on Xarelto, so procedures will need to be done at the hospital, and we will need to obtain cardiac clearance. Patient underwent colonoscopy and EGD by Dr. Liu on 8/2/2022.  The colonoscopy revealed a 5 mm polyp in the transverse colon which was removed and a 6 mm polyp in the sigmoid colon also removed with biopsy forceps.  Few left colonic diverticuli were seen and small internal hemorrhoids.  The pathology of the polyps were tubular adenoma.  Patient's EGD revealed grade B distal esophagitis biopsies showed benign inflammation TTS balloon dilator was passed to 20 mm mild gastritis was present biopsy showed benign inflammation duodenal biopsies were unremarkable. Patient returns for follow-up on 8/23/2022.  He has done well since his colonoscopy and EGD.  I carefully reviewed the findings with him, and following his esophageal dilation, he is now swallowing well he believes his reflux will be better controlled on prescription strength omeprazole 40 mg daily, rather than over-the-counter Nexium which she is now doing.  He denies any abdominal pain, his bowel movements have been normal he has had no unexplained weight loss and no overt GI bleeding as to adenomatous polyps were removed on his recent colonoscopy, due to his age, I believe we should schedule follow-up colonoscopy for surveillance in 3 years, assuming he is still in good health.  We will see him back in follow-up in 6 months. Patient returns for follow-up on 2/21/2023.  Overall he is doing very well from a GI standpoint.  He is having no significant swallowing issues, and his reflux has been well controlled on 20 mg daily of esomeprazole, and on occasion he will increase this dosing to 40 mg.  He denies abdominal pain and has had no unexplained weight loss he eats well with a good appetite, his bowel movements have been normal.  Recent lab work did show hematocrit 43.5 hemoglobin 14.3, as his iron deficiency anemia appears to have resolved.  We again discussed his last colonoscopy with 2 adenomatous polyps were removed.  Because of his age, would plan follow-up surveillance colonoscopy in 3 years, assuming at that time he is healthy enough to undergo the procedure. Patient presented in May 2023 with lower abdominal pain he underwent abdominal pelvic CT scan which was consistent with a mild sigmoid diverticulitis.  His amylase lipase CBC and CMP were all essentially within normal limits.  He subsequently improved, although experienced 1 episode of rectal bleeding. Patient returns for follow-up on 8/22/2023.  We discussed in detail his episode of mild diverticulitis that occurred in May 2023.  He did receive 2 rounds of antibiotic therapy, and his symptoms have completely resolved having no further abdominal pain and his bowel movements have been normal.  He has added Metamucil to his regimen, and has been drinking plenty of fluids.  He does experience intermittent lower abdominal discomfort which lasts only a very short time and resolves.  This is more consistent with a colonic spasm.  As it is short acting, there is no needed present to prescribe antispasmodic therapy.  He is due for follow-up colonoscopy in 3 years, with his history of adenomatous colon polyps.  He is taking daily esomeprazole 40 mg, which is controlling his reflux he is having some issues with mild dysphagia, but believes he is not yet needing another esophageal dilation. Patient underwent CBC and CMP on 2/1/2024, all was essentially normal including hematocrit 43.7 hemoglobin 14.3 Patient returns for follow-up on 2/13/2024.  His reflux has been under good control on daily esomeprazole therapy.  He is having issues of dysphagia to solid food, which has been occurring intermittently but more frequently, especially with meat.  He denies any abdominal pain, has noticed some back pain, likely musculoskeletal his bowel movements have been normal there is been no overt GI bleeding and no unexplained weight loss.  He does have a history of atrial fibrillation with a pacemaker, and is on Xarelto therapy.  We will obtain cardiac clearance for this procedure.

## 2024-03-13 ENCOUNTER — LAB (OUTPATIENT)
Dept: URBAN - METROPOLITAN AREA CLINIC 4 | Facility: CLINIC | Age: 84
End: 2024-03-13
Payer: MEDICARE

## 2024-03-13 ENCOUNTER — EGD (OUTPATIENT)
Dept: URBAN - METROPOLITAN AREA SURGERY CENTER 30 | Facility: SURGERY CENTER | Age: 84
End: 2024-03-13
Payer: MEDICARE

## 2024-03-13 DIAGNOSIS — R13.19 CERVICAL DYSPHAGIA: ICD-10-CM

## 2024-03-13 DIAGNOSIS — K31.89 OTHER DISEASES OF STOMACH AND DUODENUM: ICD-10-CM

## 2024-03-13 DIAGNOSIS — R14.0 ABDOMINAL BLOATING: ICD-10-CM

## 2024-03-13 DIAGNOSIS — K29.70 GASTRITIS, UNSPECIFIED, WITHOUT BLEEDING: ICD-10-CM

## 2024-03-13 PROCEDURE — 88312 SPECIAL STAINS GROUP 1: CPT | Performed by: PATHOLOGY

## 2024-03-13 PROCEDURE — 88305 TISSUE EXAM BY PATHOLOGIST: CPT | Performed by: PATHOLOGY

## 2024-03-13 PROCEDURE — 43450 DILATE ESOPHAGUS 1/MULT PASS: CPT | Performed by: INTERNAL MEDICINE

## 2024-03-13 PROCEDURE — 43239 EGD BIOPSY SINGLE/MULTIPLE: CPT | Performed by: INTERNAL MEDICINE

## 2024-03-13 RX ORDER — OLMESARTAN MEDOXOMIL 5 MG
AS DIRECTED TABLET ORAL
Status: ACTIVE | COMMUNITY

## 2024-03-13 RX ORDER — ESOMEPRAZOLE MAGNESIUM 40 MG/1
1 CAPSULE CAPSULE, DELAYED RELEASE ORAL ONCE A DAY
Qty: 90 CAPSULE | Refills: 3 | Status: ACTIVE | COMMUNITY

## 2024-03-13 RX ORDER — ESOMEPRAZOLE MAGNESIUM 40 MG
TAKE 1 CAPSULE BY ORAL ROUTE DAILY FOR 90 DAYS CAPSULE,DELAYED RELEASE (ENTERIC COATED) ORAL 1
Qty: 90 | Refills: 3 | Status: ACTIVE | COMMUNITY
Start: 2019-10-25

## 2024-03-13 RX ORDER — ESOMEPRAZOLE MAGNESIUM 40 MG/1
TAKE 1 CAPSULE BY MOUTH EVERY DAY CAPSULE, DELAYED RELEASE ORAL
Qty: 90 CAPSULE | Refills: 3 | Status: ACTIVE | COMMUNITY

## 2024-03-13 RX ORDER — ESOMEPRAZOLE MAGNESIUM 40 MG/1
1 CAPSULE CAPSULE, DELAYED RELEASE ORAL ONCE A DAY
Qty: 90 CAPSULE | Refills: 3 | OUTPATIENT

## 2024-04-18 ENCOUNTER — OV EP (OUTPATIENT)
Dept: URBAN - METROPOLITAN AREA CLINIC 40 | Facility: CLINIC | Age: 84
End: 2024-04-18
Payer: MEDICARE

## 2024-04-18 VITALS
WEIGHT: 200.8 LBS | HEART RATE: 75 BPM | SYSTOLIC BLOOD PRESSURE: 130 MMHG | TEMPERATURE: 98.1 F | HEIGHT: 66 IN | DIASTOLIC BLOOD PRESSURE: 72 MMHG | BODY MASS INDEX: 32.27 KG/M2

## 2024-04-18 DIAGNOSIS — D50.9 IRON DEFICIENCY ANEMIA, UNSPECIFIED IRON DEFICIENCY ANEMIA TYPE: ICD-10-CM

## 2024-04-18 DIAGNOSIS — Z86.010 HISTORY OF COLON POLYPS: ICD-10-CM

## 2024-04-18 DIAGNOSIS — R13.14 ESOPHAGEAL DYSPHAGIA: ICD-10-CM

## 2024-04-18 DIAGNOSIS — R14.0 BLOATING: ICD-10-CM

## 2024-04-18 DIAGNOSIS — K29.70 GASTRITIS: ICD-10-CM

## 2024-04-18 DIAGNOSIS — K21.9 GERD (GASTROESOPHAGEAL REFLUX DISEASE): ICD-10-CM

## 2024-04-18 DIAGNOSIS — K57.92 DIVERTICULITIS: ICD-10-CM

## 2024-04-18 PROCEDURE — 99213 OFFICE O/P EST LOW 20 MIN: CPT | Performed by: INTERNAL MEDICINE

## 2024-04-18 RX ORDER — OLMESARTAN MEDOXOMIL 5 MG
AS DIRECTED TABLET ORAL
Status: ACTIVE | COMMUNITY

## 2024-04-18 RX ORDER — ESOMEPRAZOLE MAGNESIUM 40 MG/1
1 CAPSULE CAPSULE, DELAYED RELEASE ORAL ONCE A DAY
Qty: 90 CAPSULE | Refills: 3 | Status: ACTIVE | COMMUNITY

## 2024-04-18 RX ORDER — ESOMEPRAZOLE MAGNESIUM 40 MG
TAKE 1 CAPSULE BY ORAL ROUTE DAILY FOR 90 DAYS CAPSULE,DELAYED RELEASE (ENTERIC COATED) ORAL 1
Qty: 90 | Refills: 3 | Status: ACTIVE | COMMUNITY
Start: 2019-10-25

## 2024-04-18 RX ORDER — ESOMEPRAZOLE MAGNESIUM 40 MG/1
1 CAPSULE CAPSULE, DELAYED RELEASE ORAL ONCE A DAY
Qty: 90 CAPSULE | Refills: 3 | OUTPATIENT

## 2024-04-18 NOTE — HPI-TODAY'S VISIT:
Patient is feeling well on daily Nexium 20 mg. Had one recent episode of dysphagia to solid food, none since. Last EGD was 11/26/19, showing mild gastritis, dilated to 48F Marrero.. Normal Abdominal ultrasound. He does have pacemaker, on Xarelto. Patient underwent his last colonoscopy on 8/4/2014 which was a screening exam sigmoid diverticulosis was found, the exam was otherwise unremarkable. Patient underwent lab work on 4/20/2022.  Serum ferritin was 18 serum iron was 50 TIBC 416 with a 12% saturation hematocrit 37.2 hemoglobin 12.6 WBC 5.7 Patient returns for follow-up on 5/17/2022.  He is again complaining of dysphagia to solid food, with food temporarily lodging in his retrosternal region.  He underwent his last EGD with esophageal dilation on 11/26/2019.  He denies any abdominal pain, his stools have been normal in consistency has had no unexplained weight loss recent lab work does show iron deficiency anemia with serum ferritin of 18 serum iron 50 TIBC 416 with a 12% saturation.  Hematocrit 37.2 hemoglobin 12.6.  Stool testing was Hemoccult negative.  Patient underwent his last screening colonoscopy on 8/4/2014 which showed only sigmoid diverticulosis.  He has been taking over-the-counter Nexium 20 mg daily which has been well controlling his reflux.  He does have a pacemaker and is on Xarelto, so procedures will need to be done at the hospital, and we will need to obtain cardiac clearance. Patient underwent colonoscopy and EGD by Dr. Liu on 8/2/2022.  The colonoscopy revealed a 5 mm polyp in the transverse colon which was removed and a 6 mm polyp in the sigmoid colon, also removed few diverticuli were seen, grade 1 internal hemorrhoids were noted.  The pathology of both these polyps were tubular adenoma.  Patient underwent EGD which showed grade B esophagitis which was biopsied a TTS balloon dilator 18 to 20 mm was passed diffuse mild gastritis was seen also biopsied duodenal biopsies were taken to evaluate for celiac disease.  Pathology revealed benign duodenal biopsies gastric biopsies showed mild gastritis distal esophageal biopsies revealed benign squamous mucosa. Patient underwent lab work on 1/24/2023 which included normal CBC with hematocrit 43.5 hemoglobin 14.3 WBC 6.1 and an essentially normal CMP. Patient is feeling well on daily Nexium 20 mg. Had one recent episode of dysphagia to solid food, none since. Last EGD was 11/26/19, showing mild gastritis, dilated to 48F Marrero.. Normal Abdominal ultrasound. He does have pacemaker, on Xarelto. Patient underwent his last colonoscopy on 8/4/2014 which was a screening exam sigmoid diverticulosis was found, the exam was otherwise unremarkable. Patient underwent lab work on 4/20/2022.  Serum ferritin was 18 serum iron was 50 TIBC 416 with a 12% saturation hematocrit 37.2 hemoglobin 12.6 WBC 5.7 Patient returns for follow-up on 5/17/2022.  He is again complaining of dysphagia to solid food, with food temporarily lodging in his retrosternal region.  He underwent his last EGD with esophageal dilation on 11/26/2019.  He denies any abdominal pain, his stools have been normal in consistency has had no unexplained weight loss recent lab work does show iron deficiency anemia with serum ferritin of 18 serum iron 50 TIBC 416 with a 12% saturation.  Hematocrit 37.2 hemoglobin 12.6.  Stool testing was Hemoccult negative.  Patient underwent his last screening colonoscopy on 8/4/2014 which showed only sigmoid diverticulosis.  He has been taking over-the-counter Nexium 20 mg daily which has been well controlling his reflux.  He does have a pacemaker and is on Xarelto, so procedures will need to be done at the hospital, and we will need to obtain cardiac clearance. Patient underwent colonoscopy and EGD by Dr. Liu on 8/2/2022.  The colonoscopy revealed a 5 mm polyp in the transverse colon which was removed and a 6 mm polyp in the sigmoid colon also removed with biopsy forceps.  Few left colonic diverticuli were seen and small internal hemorrhoids.  The pathology of the polyps were tubular adenoma.  Patient's EGD revealed grade B distal esophagitis biopsies showed benign inflammation TTS balloon dilator was passed to 20 mm mild gastritis was present biopsy showed benign inflammation duodenal biopsies were unremarkable. Patient returns for follow-up on 8/23/2022.  He has done well since his colonoscopy and EGD.  I carefully reviewed the findings with him, and following his esophageal dilation, he is now swallowing well he believes his reflux will be better controlled on prescription strength omeprazole 40 mg daily, rather than over-the-counter Nexium which she is now doing.  He denies any abdominal pain, his bowel movements have been normal he has had no unexplained weight loss and no overt GI bleeding as to adenomatous polyps were removed on his recent colonoscopy, due to his age, I believe we should schedule follow-up colonoscopy for surveillance in 3 years, assuming he is still in good health.  We will see him back in follow-up in 6 months. Patient returns for follow-up on 2/21/2023.  Overall he is doing very well from a GI standpoint.  He is having no significant swallowing issues, and his reflux has been well controlled on 20 mg daily of esomeprazole, and on occasion he will increase this dosing to 40 mg.  He denies abdominal pain and has had no unexplained weight loss he eats well with a good appetite, his bowel movements have been normal.  Recent lab work did show hematocrit 43.5 hemoglobin 14.3, as his iron deficiency anemia appears to have resolved.  We again discussed his last colonoscopy with 2 adenomatous polyps were removed.  Because of his age, would plan follow-up surveillance colonoscopy in 3 years, assuming at that time he is healthy enough to undergo the procedure. Patient presented in May 2023 with lower abdominal pain he underwent abdominal pelvic CT scan which was consistent with a mild sigmoid diverticulitis.  His amylase lipase CBC and CMP were all essentially within normal limits.  He subsequently improved, although experienced 1 episode of rectal bleeding. Patient returns for follow-up on 8/22/2023.  We discussed in detail his episode of mild diverticulitis that occurred in May 2023.  He did receive 2 rounds of antibiotic therapy, and his symptoms have completely resolved having no further abdominal pain and his bowel movements have been normal.  He has added Metamucil to his regimen, and has been drinking plenty of fluids.  He does experience intermittent lower abdominal discomfort which lasts only a very short time and resolves.  This is more consistent with a colonic spasm.  As it is short acting, there is no needed present to prescribe antispasmodic therapy.  He is due for follow-up colonoscopy in 3 years, with his history of adenomatous colon polyps.  He is taking daily esomeprazole 40 mg, which is controlling his reflux he is having some issues with mild dysphagia, but believes he is not yet needing another esophageal dilation. Patient underwent CBC and CMP on 2/1/2024, all was essentially normal including hematocrit 43.7 hemoglobin 14.3 Patient returns for follow-up on 2/13/2024.  His reflux has been under good control on daily esomeprazole therapy.  He is having issues of dysphagia to solid food, which has been occurring intermittently but more frequently, especially with meat.  He denies any abdominal pain, has noticed some back pain, likely musculoskeletal his bowel movements have been normal there is been no overt GI bleeding and no unexplained weight loss.  He does have a history of atrial fibrillation with a pacemaker, and is on Xarelto therapy.  We will obtain cardiac clearance for this procedure. Patient underwent EGD on 3/13/2024 there was no obvious endoscopic abnormality in the esophagus to explain his complaints of dysphagia we did perform Marrero dilation with a 50 and 54 [place] dilator.  Patchy mild gastritis was seen which was biopsied.  Pathology of the gastric biopsies revealed benign inflammation. Patient returns for follow-up on 4/18/2024.  He has done well since his recent EGD and esophageal dilation, and is now swallowing very well his reflux is well-controlled on esomeprazole 40 mg daily.  I reviewed the results of his recent endoscopy which showed patchy mild gastritis which was biopsied pathology of the gastric biopsies showed benign inflammation he was dilated with a 50 and 54 [place] Marrero dilator, which she is tolerated well.  He denies abdominal pain eats well with a good appetite and his bowel movements are normal.  For his history of colon polyps, we will plan follow-up colonoscopy in 1 year.

## 2024-10-17 ENCOUNTER — OFFICE VISIT (OUTPATIENT)
Dept: URBAN - METROPOLITAN AREA CLINIC 40 | Facility: CLINIC | Age: 84
End: 2024-10-17

## 2024-11-05 ENCOUNTER — OFFICE VISIT (OUTPATIENT)
Dept: URBAN - METROPOLITAN AREA CLINIC 40 | Facility: CLINIC | Age: 84
End: 2024-11-05
Payer: MEDICARE

## 2024-11-05 DIAGNOSIS — K64.8 INTERNAL HEMORRHOIDS: ICD-10-CM

## 2024-11-05 DIAGNOSIS — K64.4 EXTERNAL HEMORRHOID: ICD-10-CM

## 2024-11-05 DIAGNOSIS — Z86.0100 PERSONAL HISTORY OF COLONIC POLYPS: ICD-10-CM

## 2024-11-05 DIAGNOSIS — K62.89 RECTAL PAIN: ICD-10-CM

## 2024-11-05 DIAGNOSIS — Z87.19 HISTORY OF GASTRITIS: ICD-10-CM

## 2024-11-05 PROCEDURE — 99213 OFFICE O/P EST LOW 20 MIN: CPT | Performed by: PHYSICIAN ASSISTANT

## 2024-11-05 RX ORDER — ESOMEPRAZOLE MAGNESIUM 40 MG
TAKE 1 CAPSULE BY ORAL ROUTE DAILY FOR 90 DAYS CAPSULE,DELAYED RELEASE (ENTERIC COATED) ORAL 1
Qty: 90 | Refills: 3 | Status: ACTIVE | COMMUNITY
Start: 2019-10-25

## 2024-11-05 RX ORDER — ESOMEPRAZOLE MAGNESIUM 40 MG/1
1 CAPSULE CAPSULE, DELAYED RELEASE ORAL ONCE A DAY
Qty: 90 CAPSULE | Refills: 3 | Status: ACTIVE | COMMUNITY

## 2024-11-05 RX ORDER — OLMESARTAN MEDOXOMIL 5 MG
AS DIRECTED TABLET ORAL
Status: ACTIVE | COMMUNITY

## 2024-11-05 NOTE — PHYSICAL EXAM RECTAL:
(April, MA chaperone in room) no masses palpable, no melena, no red blood. Nonthrombosed EH, medium. No evidence of fissure.

## 2024-11-05 NOTE — HPI-TODAY'S VISIT:
Mr. Pinedo is an 84-year-old white male who presents to the office today with complaint of suspected rectal prolapse vs prolapsing hemorrhoids, last seen for follow-up April 18, 2024.  Earlier this year, patient did have an EGD for evaluation of reflux and dysphagia.  No findings to explain dysphagia during EGD March 13, 2024, but patient empirically dilated to 54 Prydeinig.  Patchy gastritis noted.  Reactive gastropathy, per pathology.  No H. pylori or dysplasia.  Back in 2022, he had a colonoscopy where two, small tubular adenomas removed from the transverse colon and sigmoid colon.  At that time he had also had another EGD where small bowel biopsies unremarkable, mild gastritis noted with no H. pylori and esophageal biopsies benign.  Patient has had multiple EGDs with dilation in the past due to recurrent dysphagia and has tolerated dilation well.  Before his recent colonoscopy, he had a normal exam in 2014.  Patient has been holding Xarelto for the last seven days. Patient reports some perianal and rectal pain in the last week, not improved with topical therapy.  Denies any strenuous activity, trauma, new medications or NSAID use while on Xarelto.  He has not let his cardiologist know that he has been off of the blood thinner but states that it has been debated between the original prescriber whether he needed to be on this long-term with pacemaker in place.  No nausea, vomiting, chest pain.  Had some lower abdominal pain but none currently.  He felt like the pain was radiating toward the tailbone and down mid thigh.  Patient denies any change in his medical history since last visit, trauma or prolonged sitting.  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  He has intentionally lost weight over the years.  Good appetite.

## 2024-11-06 ENCOUNTER — TELEPHONE ENCOUNTER (OUTPATIENT)
Dept: URBAN - METROPOLITAN AREA CLINIC 40 | Facility: CLINIC | Age: 84
End: 2024-11-06

## 2024-11-07 ENCOUNTER — OFFICE VISIT (OUTPATIENT)
Dept: URBAN - METROPOLITAN AREA CLINIC 40 | Facility: CLINIC | Age: 84
End: 2024-11-07

## 2024-11-19 ENCOUNTER — OFFICE VISIT (OUTPATIENT)
Dept: URBAN - METROPOLITAN AREA CLINIC 40 | Facility: CLINIC | Age: 84
End: 2024-11-19
Payer: MEDICARE

## 2024-11-19 ENCOUNTER — DASHBOARD ENCOUNTERS (OUTPATIENT)
Age: 84
End: 2024-11-19

## 2024-11-19 VITALS
BODY MASS INDEX: 26.68 KG/M2 | SYSTOLIC BLOOD PRESSURE: 118 MMHG | HEIGHT: 66 IN | DIASTOLIC BLOOD PRESSURE: 62 MMHG | HEART RATE: 61 BPM | TEMPERATURE: 97.9 F | WEIGHT: 166 LBS

## 2024-11-19 DIAGNOSIS — K62.89 RECTAL PAIN: ICD-10-CM

## 2024-11-19 DIAGNOSIS — K21.00 GASTROESOPHAGEAL REFLUX DISEASE WITH ESOPHAGITIS WITHOUT HEMORRHAGE: ICD-10-CM

## 2024-11-19 DIAGNOSIS — K64.4 EXTERNAL HEMORRHOID: ICD-10-CM

## 2024-11-19 DIAGNOSIS — Z86.0101 HISTORY OF ADENOMATOUS POLYP OF COLON: ICD-10-CM

## 2024-11-19 PROBLEM — 266433003: Status: ACTIVE | Noted: 2024-11-19

## 2024-11-19 PROCEDURE — 99214 OFFICE O/P EST MOD 30 MIN: CPT | Performed by: INTERNAL MEDICINE

## 2024-11-19 RX ORDER — ESOMEPRAZOLE MAGNESIUM 40 MG
TAKE 1 CAPSULE BY ORAL ROUTE DAILY FOR 90 DAYS CAPSULE,DELAYED RELEASE (ENTERIC COATED) ORAL 1
Qty: 90 | Refills: 3 | Status: ACTIVE | COMMUNITY
Start: 2019-10-25

## 2024-11-19 RX ORDER — ESOMEPRAZOLE MAGNESIUM 40 MG/1
1 CAPSULE CAPSULE, DELAYED RELEASE PELLETS ORAL ONCE A DAY
Qty: 90 CAPSULE | Refills: 3 | OUTPATIENT
Start: 2024-11-19

## 2024-11-19 RX ORDER — OLMESARTAN MEDOXOMIL 5 MG
AS DIRECTED TABLET ORAL
Status: ACTIVE | COMMUNITY

## 2024-11-19 RX ORDER — ESOMEPRAZOLE MAGNESIUM 40 MG/1
1 CAPSULE CAPSULE, DELAYED RELEASE ORAL ONCE A DAY
Qty: 90 CAPSULE | Refills: 3 | Status: ACTIVE | COMMUNITY

## 2024-11-19 NOTE — HPI-TODAY'S VISIT:
Mr. Pinedo is an 84-year-old white male who presents to the office today with complaint of suspected rectal prolapse vs prolapsing hemorrhoids, last seen for follow-up April 18, 2024.  Earlier this year, patient did have an EGD for evaluation of reflux and dysphagia.  No findings to explain dysphagia during EGD March 13, 2024, but patient empirically dilated to 54 Icelandic.  Patchy gastritis noted.  Reactive gastropathy, per pathology.  No H. pylori or dysplasia.  Back in 2022, he had a colonoscopy where two, small tubular adenomas removed from the transverse colon and sigmoid colon.  At that time he had also had another EGD where small bowel biopsies unremarkable, mild gastritis noted with no H. pylori and esophageal biopsies benign.  Patient has had multiple EGDs with dilation in the past due to recurrent dysphagia and has tolerated dilation well.  Before his recent colonoscopy, he had a normal exam in 2014.  Patient has been holding Xarelto for the last seven days. Patient reports some perianal and rectal pain in the last week, not improved with topical therapy.  Denies any strenuous activity, trauma, new medications or NSAID use while on Xarelto.  He has not let his cardiologist know that he has been off of the blood thinner but states that it has been debated between the original prescriber whether he needed to be on this long-term with pacemaker in place.  No nausea, vomiting, chest pain.  Had some lower abdominal pain but none currently.  He felt like the pain was radiating toward the tailbone and down mid thigh.  Patient denies any change in his medical history since last visit, trauma or prolonged sitting.  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  He has intentionally lost weight over the years.  Good appetite. Patient was seen by Dr. Dacosta  general surgery on 11/11/2024 to evaluate a resolving external hemorrhoid the assessment was that he was doing well and recommendation for warm soaks and hydrocortisone cream if needed. Patient is feeling well on daily Nexium 20 mg. Had one recent episode of dysphagia to solid food, none since. Last EGD was 11/26/19, showing mild gastritis, dilated to 48F Marrero.. Normal Abdominal ultrasound. He does have pacemaker, on Xarelto. Patient underwent his last colonoscopy on 8/4/2014 which was a screening exam sigmoid diverticulosis was found, the exam was otherwise unremarkable. Patient underwent lab work on 4/20/2022.  Serum ferritin was 18 serum iron was 50 TIBC 416 with a 12% saturation hematocrit 37.2 hemoglobin 12.6 WBC 5.7 Patient returns for follow-up on 5/17/2022.  He is again complaining of dysphagia to solid food, with food temporarily lodging in his retrosternal region.  He underwent his last EGD with esophageal dilation on 11/26/2019.  He denies any abdominal pain, his stools have been normal in consistency has had no unexplained weight loss recent lab work does show iron deficiency anemia with serum ferritin of 18 serum iron 50 TIBC 416 with a 12% saturation.  Hematocrit 37.2 hemoglobin 12.6.  Stool testing was Hemoccult negative.  Patient underwent his last screening colonoscopy on 8/4/2014 which showed only sigmoid diverticulosis.  He has been taking over-the-counter Nexium 20 mg daily which has been well controlling his reflux.  He does have a pacemaker and is on Xarelto, so procedures will need to be done at the hospital, and we will need to obtain cardiac clearance. Patient underwent colonoscopy and EGD by Dr. Liu on 8/2/2022.  The colonoscopy revealed a 5 mm polyp in the transverse colon which was removed and a 6 mm polyp in the sigmoid colon, also removed few diverticuli were seen, grade 1 internal hemorrhoids were noted.  The pathology of both these polyps were tubular adenoma.  Patient underwent EGD which showed grade B esophagitis which was biopsied a TTS balloon dilator 18 to 20 mm was passed diffuse mild gastritis was seen also biopsied duodenal biopsies were taken to evaluate for celiac disease.  Pathology revealed benign duodenal biopsies gastric biopsies showed mild gastritis distal esophageal biopsies revealed benign squamous mucosa. Patient underwent lab work on 1/24/2023 which included normal CBC with hematocrit 43.5 hemoglobin 14.3 WBC 6.1 and an essentially normal CMP. Patient is feeling well on daily Nexium 20 mg. Had one recent episode of dysphagia to solid food, none since. Last EGD was 11/26/19, showing mild gastritis, dilated to 48F Marrero.. Normal Abdominal ultrasound. He does have pacemaker, on Xarelto. Patient underwent his last colonoscopy on 8/4/2014 which was a screening exam sigmoid diverticulosis was found, the exam was otherwise unremarkable. Patient underwent lab work on 4/20/2022.  Serum ferritin was 18 serum iron was 50 TIBC 416 with a 12% saturation hematocrit 37.2 hemoglobin 12.6 WBC 5.7 Patient returns for follow-up on 5/17/2022.  He is again complaining of dysphagia to solid food, with food temporarily lodging in his retrosternal region.  He underwent his last EGD with esophageal dilation on 11/26/2019.  He denies any abdominal pain, his stools have been normal in consistency has had no unexplained weight loss recent lab work does show iron deficiency anemia with serum ferritin of 18 serum iron 50 TIBC 416 with a 12% saturation.  Hematocrit 37.2 hemoglobin 12.6.  Stool testing was Hemoccult negative.  Patient underwent his last screening colonoscopy on 8/4/2014 which showed only sigmoid diverticulosis.  He has been taking over-the-counter Nexium 20 mg daily which has been well controlling his reflux.  He does have a pacemaker and is on Xarelto, so procedures will need to be done at the hospital, and we will need to obtain cardiac clearance. Patient underwent colonoscopy and EGD by Dr. Liu on 8/2/2022.  The colonoscopy revealed a 5 mm polyp in the transverse colon which was removed and a 6 mm polyp in the sigmoid colon also removed with biopsy forceps.  Few left colonic diverticuli were seen and small internal hemorrhoids.  The pathology of the polyps were tubular adenoma.  Patient's EGD revealed grade B distal esophagitis biopsies showed benign inflammation TTS balloon dilator was passed to 20 mm mild gastritis was present biopsy showed benign inflammation duodenal biopsies were unremarkable. Patient returns for follow-up on 8/23/2022.  He has done well since his colonoscopy and EGD.  I carefully reviewed the findings with him, and following his esophageal dilation, he is now swallowing well he believes his reflux will be better controlled on prescription strength omeprazole 40 mg daily, rather than over-the-counter Nexium which she is now doing.  He denies any abdominal pain, his bowel movements have been normal he has had no unexplained weight loss and no overt GI bleeding as to adenomatous polyps were removed on his recent colonoscopy, due to his age, I believe we should schedule follow-up colonoscopy for surveillance in 3 years, assuming he is still in good health.  We will see him back in follow-up in 6 months. Patient returns for follow-up on 2/21/2023.  Overall he is doing very well from a GI standpoint.  He is having no significant swallowing issues, and his reflux has been well controlled on 20 mg daily of esomeprazole, and on occasion he will increase this dosing to 40 mg.  He denies abdominal pain and has had no unexplained weight loss he eats well with a good appetite, his bowel movements have been normal.  Recent lab work did show hematocrit 43.5 hemoglobin 14.3, as his iron deficiency anemia appears to have resolved.  We again discussed his last colonoscopy with 2 adenomatous polyps were removed.  Because of his age, would plan follow-up surveillance colonoscopy in 3 years, assuming at that time he is healthy enough to undergo the procedure. Patient presented in May 2023 with lower abdominal pain he underwent abdominal pelvic CT scan which was consistent with a mild sigmoid diverticulitis.  His amylase lipase CBC and CMP were all essentially within normal limits.  He subsequently improved, although experienced 1 episode of rectal bleeding. Patient returns for follow-up on 8/22/2023.  We discussed in detail his episode of mild diverticulitis that occurred in May 2023.  He did receive 2 rounds of antibiotic therapy, and his symptoms have completely resolved having no further abdominal pain and his bowel movements have been normal.  He has added Metamucil to his regimen, and has been drinking plenty of fluids.  He does experience intermittent lower abdominal discomfort which lasts only a very short time and resolves.  This is more consistent with a colonic spasm.  As it is short acting, there is no needed present to prescribe antispasmodic therapy.  He is due for follow-up colonoscopy in 3 years, with his history of adenomatous colon polyps.  He is taking daily esomeprazole 40 mg, which is controlling his reflux he is having some issues with mild dysphagia, but believes he is not yet needing another esophageal dilation. Patient underwent CBC and CMP on 2/1/2024, all was essentially normal including hematocrit 43.7 hemoglobin 14.3 Patient returns for follow-up on 2/13/2024.  His reflux has been under good control on daily esomeprazole therapy.  He is having issues of dysphagia to solid food, which has been occurring intermittently but more frequently, especially with meat.  He denies any abdominal pain, has noticed some back pain, likely musculoskeletal his bowel movements have been normal there is been no overt GI bleeding and no unexplained weight loss.  He does have a history of atrial fibrillation with a pacemaker, and is on Xarelto therapy.  We will obtain cardiac clearance for this procedure. Patient underwent EGD on 3/13/2024 there was no obvious endoscopic abnormality in the esophagus to explain his complaints of dysphagia we did perform Marrero dilation with a 50 and 54 Icelandic dilator.  Patchy mild gastritis was seen which was biopsied.  Pathology of the gastric biopsies revealed benign inflammation. Patient returns for follow-up on 4/18/2024.  He has done well since his recent EGD and esophageal dilation, and is now swallowing very well his reflux is well-controlled on esomeprazole 40 mg daily.  I reviewed the results of his recent endoscopy which showed patchy mild gastritis which was biopsied pathology of the gastric biopsies showed benign inflammation he was dilated with a 50 and 54 [place]ic Marrero dilator, which she is tolerated well.  He denies abdominal pain eats well with a good appetite and his bowel movements are normal.  For his history of colon polyps, we will plan follow-up colonoscopy in 1 year. Patient returns for follow-up on 11/19/2024.  He states that overall his external hemorrhoid, is responding slowly to generic RectiCare hydrocortisone cream and soaking with warm water.  He has seen Dr. Dacosta, who recommended this conservative approach, with patient not requiring surgery at this time.  Overall he is feeling better he denies bleeding and his bowel movements have been normal he denies abdominal pain his heartburn and reflux have been well-controlled on daily esomeprazole therapy he does have brief episodes of reflux induced esophageal spasm he denies any significant dysphagia at this time.  He has a history of colon polyps, and is due for follow-up surveillance colonoscopy next year.

## 2024-11-26 ENCOUNTER — OFFICE VISIT (OUTPATIENT)
Dept: URBAN - METROPOLITAN AREA CLINIC 40 | Facility: CLINIC | Age: 84
End: 2024-11-26

## 2025-03-25 ENCOUNTER — LAB OUTSIDE AN ENCOUNTER (OUTPATIENT)
Dept: URBAN - METROPOLITAN AREA CLINIC 40 | Facility: CLINIC | Age: 85
End: 2025-03-25

## 2025-03-25 ENCOUNTER — OFFICE VISIT (OUTPATIENT)
Dept: URBAN - METROPOLITAN AREA CLINIC 40 | Facility: CLINIC | Age: 85
End: 2025-03-25
Payer: MEDICARE

## 2025-03-25 DIAGNOSIS — K21.00 GASTROESOPHAGEAL REFLUX DISEASE WITH ESOPHAGITIS WITHOUT HEMORRHAGE: ICD-10-CM

## 2025-03-25 DIAGNOSIS — Z86.0101 HISTORY OF ADENOMATOUS POLYP OF COLON: ICD-10-CM

## 2025-03-25 DIAGNOSIS — R13.19 ESOPHAGEAL DYSPHAGIA: ICD-10-CM

## 2025-03-25 DIAGNOSIS — R10.30 LOWER ABDOMINAL PAIN: ICD-10-CM

## 2025-03-25 PROCEDURE — 99214 OFFICE O/P EST MOD 30 MIN: CPT | Performed by: INTERNAL MEDICINE

## 2025-03-25 RX ORDER — ESOMEPRAZOLE MAGNESIUM 40 MG
TAKE 1 CAPSULE BY ORAL ROUTE DAILY FOR 90 DAYS CAPSULE,DELAYED RELEASE (ENTERIC COATED) ORAL 1
Qty: 90 | Refills: 3 | Status: ON HOLD | COMMUNITY
Start: 2019-10-25

## 2025-03-25 RX ORDER — OLMESARTAN MEDOXOMIL 5 MG/1
AS DIRECTED TABLET, FILM COATED ORAL
Status: ACTIVE | COMMUNITY

## 2025-03-25 RX ORDER — ESOMEPRAZOLE MAGNESIUM 40 MG/1
1 CAPSULE CAPSULE, DELAYED RELEASE PELLETS ORAL ONCE A DAY
Qty: 90 CAPSULE | Refills: 3 | Status: ON HOLD | COMMUNITY
Start: 2024-11-19

## 2025-03-25 RX ORDER — ESOMEPRAZOLE MAGNESIUM 40 MG/1
1 CAPSULE CAPSULE, DELAYED RELEASE PELLETS ORAL ONCE A DAY
Qty: 90 CAPSULE | Refills: 3 | OUTPATIENT
Start: 2025-03-22

## 2025-03-25 RX ORDER — ESOMEPRAZOLE MAGNESIUM 40 MG/1
1 CAPSULE CAPSULE, DELAYED RELEASE ORAL ONCE A DAY
Qty: 90 CAPSULE | Refills: 3 | Status: ACTIVE | COMMUNITY

## 2025-03-25 NOTE — HPI-TODAY'S VISIT:
Mr. Pinedo is an 84-year-old white male who presents to the office today with complaint of suspected rectal prolapse vs prolapsing hemorrhoids, last seen for follow-up April 18, 2024.  Earlier this year, patient did have an EGD for evaluation of reflux and dysphagia.  No findings to explain dysphagia during EGD March 13, 2024, but patient empirically dilated to 54 [place] African.  Patchy gastritis noted.  Reactive gastropathy, per pathology.  No H. pylori or dysplasia.  Back in 2022, he had a colonoscopy where two, small tubular adenomas removed from the transverse colon and sigmoid colon.  At that time he had also had another EGD where small bowel biopsies unremarkable, mild gastritis noted with no H. pylori and esophageal biopsies benign.  Patient has had multiple EGDs with dilation in the past due to recurrent dysphagia and has tolerated dilation well.  Before his recent colonoscopy, he had a normal exam in 2014.  Patient has been holding Xarelto for the last seven days. Patient reports some perianal and rectal pain in the last week, not improved with topical therapy.  Denies any strenuous activity, trauma, new medications or NSAID use while on Xarelto.  He has not let his cardiologist know that he has been off of the blood thinner but states that it has been debated between the original prescriber whether he needed to be on this long-term with pacemaker in place.  No nausea, vomiting, chest pain.  Had some lower abdominal pain but none currently.  He felt like the pain was radiating toward the tailbone and down mid thigh.  Patient denies any change in his medical history since last visit, trauma or prolonged sitting.  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  He has intentionally lost weight over the years.  Good appetite. Patient was seen by Dr. Dacosta  general surgery on 11/11/2024 to evaluate a resolving external hemorrhoid the assessment was that he was doing well and recommendation for warm soaks and hydrocortisone cream if needed. Patient is feeling well on daily Nexium 20 mg. Had one recent episode of dysphagia to solid food, none since. Last EGD was 11/26/19, showing mild gastritis, dilated to 48F Marrero.. Normal Abdominal ultrasound. He does have pacemaker, on Xarelto. Patient underwent his last colonoscopy on 8/4/2014 which was a screening exam sigmoid diverticulosis was found, the exam was otherwise unremarkable. Patient underwent lab work on 4/20/2022.  Serum ferritin was 18 serum iron was 50 TIBC 416 with a 12% saturation hematocrit 37.2 hemoglobin 12.6 WBC 5.7 Patient returns for follow-up on 5/17/2022.  He is again complaining of dysphagia to solid food, with food temporarily lodging in his retrosternal region.  He underwent his last EGD with esophageal dilation on 11/26/2019.  He denies any abdominal pain, his stools have been normal in consistency has had no unexplained weight loss recent lab work does show iron deficiency anemia with serum ferritin of 18 serum iron 50 TIBC 416 with a 12% saturation.  Hematocrit 37.2 hemoglobin 12.6.  Stool testing was Hemoccult negative.  Patient underwent his last screening colonoscopy on 8/4/2014 which showed only sigmoid diverticulosis.  He has been taking over-the-counter Nexium 20 mg daily which has been well controlling his reflux.  He does have a pacemaker and is on Xarelto, so procedures will need to be done at the hospital, and we will need to obtain cardiac clearance. Patient underwent colonoscopy and EGD by Dr. Liu on 8/2/2022.  The colonoscopy revealed a 5 mm polyp in the transverse colon which was removed and a 6 mm polyp in the sigmoid colon, also removed few diverticuli were seen, grade 1 internal hemorrhoids were noted.  The pathology of both these polyps were tubular adenoma.  Patient underwent EGD which showed grade B esophagitis which was biopsied a TTS balloon dilator 18 to 20 mm was passed diffuse mild gastritis was seen also biopsied duodenal biopsies were taken to evaluate for celiac disease.  Pathology revealed benign duodenal biopsies gastric biopsies showed mild gastritis distal esophageal biopsies revealed benign squamous mucosa. Patient underwent lab work on 1/24/2023 which included normal CBC with hematocrit 43.5 hemoglobin 14.3 WBC 6.1 and an essentially normal CMP. Patient is feeling well on daily Nexium 20 mg. Had one recent episode of dysphagia to solid food, none since. Last EGD was 11/26/19, showing mild gastritis, dilated to 48F Marrero.. Normal Abdominal ultrasound. He does have pacemaker, on Xarelto. Patient underwent his last colonoscopy on 8/4/2014 which was a screening exam sigmoid diverticulosis was found, the exam was otherwise unremarkable. Patient underwent lab work on 4/20/2022.  Serum ferritin was 18 serum iron was 50 TIBC 416 with a 12% saturation hematocrit 37.2 hemoglobin 12.6 WBC 5.7 Patient returns for follow-up on 5/17/2022.  He is again complaining of dysphagia to solid food, with food temporarily lodging in his retrosternal region.  He underwent his last EGD with esophageal dilation on 11/26/2019.  He denies any abdominal pain, his stools have been normal in consistency has had no unexplained weight loss recent lab work does show iron deficiency anemia with serum ferritin of 18 serum iron 50 TIBC 416 with a 12% saturation.  Hematocrit 37.2 hemoglobin 12.6.  Stool testing was Hemoccult negative.  Patient underwent his last screening colonoscopy on 8/4/2014 which showed only sigmoid diverticulosis.  He has been taking over-the-counter Nexium 20 mg daily which has been well controlling his reflux.  He does have a pacemaker and is on Xarelto, so procedures will need to be done at the hospital, and we will need to obtain cardiac clearance. Patient underwent colonoscopy and EGD by Dr. Liu on 8/2/2022.  The colonoscopy revealed a 5 mm polyp in the transverse colon which was removed and a 6 mm polyp in the sigmoid colon also removed with biopsy forceps.  Few left colonic diverticuli were seen and small internal hemorrhoids.  The pathology of the polyps were tubular adenoma.  Patient's EGD revealed grade B distal esophagitis biopsies showed benign inflammation TTS balloon dilator was passed to 20 mm mild gastritis was present biopsy showed benign inflammation duodenal biopsies were unremarkable. Patient returns for follow-up on 8/23/2022.  He has done well since his colonoscopy and EGD.  I carefully reviewed the findings with him, and following his esophageal dilation, he is now swallowing well he believes his reflux will be better controlled on prescription strength omeprazole 40 mg daily, rather than over-the-counter Nexium which she is now doing.  He denies any abdominal pain, his bowel movements have been normal he has had no unexplained weight loss and no overt GI bleeding as to adenomatous polyps were removed on his recent colonoscopy, due to his age, I believe we should schedule follow-up colonoscopy for surveillance in 3 years, assuming he is still in good health.  We will see him back in follow-up in 6 months. Patient returns for follow-up on 2/21/2023.  Overall he is doing very well from a GI standpoint.  He is having no significant swallowing issues, and his reflux has been well controlled on 20 mg daily of esomeprazole, and on occasion he will increase this dosing to 40 mg.  He denies abdominal pain and has had no unexplained weight loss he eats well with a good appetite, his bowel movements have been normal.  Recent lab work did show hematocrit 43.5 hemoglobin 14.3, as his iron deficiency anemia appears to have resolved.  We again discussed his last colonoscopy with 2 adenomatous polyps were removed.  Because of his age, would plan follow-up surveillance colonoscopy in 3 years, assuming at that time he is healthy enough to undergo the procedure. Patient presented in May 2023 with lower abdominal pain he underwent abdominal pelvic CT scan which was consistent with a mild sigmoid diverticulitis.  His amylase lipase CBC and CMP were all essentially within normal limits.  He subsequently improved, although experienced 1 episode of rectal bleeding. Patient returns for follow-up on 8/22/2023.  We discussed in detail his episode of mild diverticulitis that occurred in May 2023.  He did receive 2 rounds of antibiotic therapy, and his symptoms have completely resolved having no further abdominal pain and his bowel movements have been normal.  He has added Metamucil to his regimen, and has been drinking plenty of fluids.  He does experience intermittent lower abdominal discomfort which lasts only a very short time and resolves.  This is more consistent with a colonic spasm.  As it is short acting, there is no needed present to prescribe antispasmodic therapy.  He is due for follow-up colonoscopy in 3 years, with his history of adenomatous colon polyps.  He is taking daily esomeprazole 40 mg, which is controlling his reflux he is having some issues with mild dysphagia, but believes he is not yet needing another esophageal dilation. Patient underwent CBC and CMP on 2/1/2024, all was essentially normal including hematocrit 43.7 hemoglobin 14.3 Patient returns for follow-up on 2/13/2024.  His reflux has been under good control on daily esomeprazole therapy.  He is having issues of dysphagia to solid food, which has been occurring intermittently but more frequently, especially with meat.  He denies any abdominal pain, has noticed some back pain, likely musculoskeletal his bowel movements have been normal there is been no overt GI bleeding and no unexplained weight loss.  He does have a history of atrial fibrillation with a pacemaker, and is on Xarelto therapy.  We will obtain cardiac clearance for this procedure. Patient underwent EGD on 3/13/2024 there was no obvious endoscopic abnormality in the esophagus to explain his complaints of dysphagia we did perform Marrero dilation with a 50 and 54 [place] African dilator.  Patchy mild gastritis was seen which was biopsied.  Pathology of the gastric biopsies revealed benign inflammation. Patient returns for follow-up on 4/18/2024.  He has done well since his recent EGD and esophageal dilation, and is now swallowing very well his reflux is well-controlled on esomeprazole 40 mg daily.  I reviewed the results of his recent endoscopy which showed patchy mild gastritis which was biopsied pathology of the gastric biopsies showed benign inflammation he was dilated with a 50 and 54 [place] Marrero dilator, which she is tolerated well.  He denies abdominal pain eats well with a good appetite and his bowel movements are normal.  For his history of colon polyps, we will plan follow-up colonoscopy in 1 year. Patient returns for follow-up on 11/19/2024.  He states that overall his external hemorrhoid, is responding slowly to generic RectiCare hydrocortisone cream and soaking with warm water.  He has seen Dr. Dacosta, who recommended this conservative approach, with patient not requiring surgery at this time.  Overall he is feeling better he denies bleeding and his bowel movements have been normal he denies abdominal pain his heartburn and reflux have been well-controlled on daily esomeprazole therapy he does have brief episodes of reflux induced esophageal spasm he denies any significant dysphagia at this time.  He has a history of colon polyps, and is due for follow-up surveillance colonoscopy next year.             Labwork from 2/7/25 included a normal CBC and CMP. Patient returns for follow-up on 3/25/2025.  He has been complaining of increased indigestion and abdominal gas.  He is also having some lower abdominal discomfort which does improve passing flatus and with a bowel movement.  He is having mild episodes of dysphagia to solid food he had been taking Nexium 40 mg every morning and increase the dosing to an additional 20 mg before dinner he states this made him feel somewhat better.  Because of the chest discomfort and his history of atrial fibrillation he does have an appointment with cardiologist next week.  He has had episodes of chest discomfort accompanied by reflux.  He does have a history of colon polyps and is due for follow-up surveillance colonoscopy.

## 2025-05-02 ENCOUNTER — CLAIMS CREATED FROM THE CLAIM WINDOW (OUTPATIENT)
Dept: URBAN - METROPOLITAN AREA SURGERY CENTER 30 | Facility: SURGERY CENTER | Age: 85
End: 2025-05-02
Payer: MEDICARE

## 2025-05-02 ENCOUNTER — CLAIMS CREATED FROM THE CLAIM WINDOW (OUTPATIENT)
Dept: URBAN - METROPOLITAN AREA CLINIC 4 | Facility: CLINIC | Age: 85
End: 2025-05-02
Payer: MEDICARE

## 2025-05-02 DIAGNOSIS — D12.2 ADENOMA OF ASCENDING COLON: ICD-10-CM

## 2025-05-02 DIAGNOSIS — Z86.0101 H/O ADENOMATOUS POLYP OF COLON: ICD-10-CM

## 2025-05-02 DIAGNOSIS — R13.19 CERVICAL DYSPHAGIA: ICD-10-CM

## 2025-05-02 DIAGNOSIS — Z86.0100 PERSONAL HISTORY OF COLON POLYPS, UNSPECIFIED: ICD-10-CM

## 2025-05-02 DIAGNOSIS — K29.70 GASTRITIS WITHOUT BLEEDING, UNSPECIFIED CHRONICITY, UNSPECIFIED GASTRITIS TYPE: ICD-10-CM

## 2025-05-02 DIAGNOSIS — D12.2 BENIGN NEOPLASM OF ASCENDING COLON: ICD-10-CM

## 2025-05-02 DIAGNOSIS — K29.60 OTHER GASTRITIS WITHOUT BLEEDING: ICD-10-CM

## 2025-05-02 DIAGNOSIS — K29.60 ADENOPAPILLOMATOSIS GASTRICA: ICD-10-CM

## 2025-05-02 PROCEDURE — 88313 SPECIAL STAINS GROUP 2: CPT | Performed by: PATHOLOGY

## 2025-05-02 PROCEDURE — 43239 EGD BIOPSY SINGLE/MULTIPLE: CPT | Performed by: INTERNAL MEDICINE

## 2025-05-02 PROCEDURE — 43450 DILATE ESOPHAGUS 1/MULT PASS: CPT | Performed by: INTERNAL MEDICINE

## 2025-05-02 PROCEDURE — 88305 TISSUE EXAM BY PATHOLOGIST: CPT | Performed by: PATHOLOGY

## 2025-05-02 PROCEDURE — 45385 COLONOSCOPY W/LESION REMOVAL: CPT | Performed by: INTERNAL MEDICINE

## 2025-05-02 PROCEDURE — 88342 IMHCHEM/IMCYTCHM 1ST ANTB: CPT | Performed by: PATHOLOGY

## 2025-05-02 PROCEDURE — 00813 ANES UPR LWR GI NDSC PX: CPT | Performed by: NURSE ANESTHETIST, CERTIFIED REGISTERED

## 2025-05-02 RX ORDER — ESOMEPRAZOLE MAGNESIUM 40 MG
TAKE 1 CAPSULE BY ORAL ROUTE DAILY FOR 90 DAYS CAPSULE,DELAYED RELEASE (ENTERIC COATED) ORAL 1
Qty: 90 | Refills: 3 | Status: ON HOLD | COMMUNITY
Start: 2019-10-25

## 2025-05-02 RX ORDER — OLMESARTAN MEDOXOMIL 5 MG/1
AS DIRECTED TABLET, FILM COATED ORAL
Status: ACTIVE | COMMUNITY

## 2025-05-02 RX ORDER — ESOMEPRAZOLE MAGNESIUM 40 MG/1
1 CAPSULE CAPSULE, DELAYED RELEASE ORAL ONCE A DAY
Qty: 90 CAPSULE | Refills: 3 | Status: ACTIVE | COMMUNITY

## 2025-05-02 RX ORDER — ESOMEPRAZOLE MAGNESIUM 40 MG/1
1 CAPSULE CAPSULE, DELAYED RELEASE PELLETS ORAL ONCE A DAY
Qty: 90 CAPSULE | Refills: 3 | Status: ACTIVE | COMMUNITY
Start: 2025-03-22

## 2025-05-20 ENCOUNTER — OFFICE VISIT (OUTPATIENT)
Dept: URBAN - METROPOLITAN AREA CLINIC 40 | Facility: CLINIC | Age: 85
End: 2025-05-20

## 2025-05-22 ENCOUNTER — OFFICE VISIT (OUTPATIENT)
Dept: URBAN - METROPOLITAN AREA CLINIC 40 | Facility: CLINIC | Age: 85
End: 2025-05-22

## 2025-06-05 ENCOUNTER — OFFICE VISIT (OUTPATIENT)
Dept: URBAN - METROPOLITAN AREA CLINIC 40 | Facility: CLINIC | Age: 85
End: 2025-06-05

## 2025-06-19 ENCOUNTER — OFFICE VISIT (OUTPATIENT)
Dept: URBAN - METROPOLITAN AREA CLINIC 40 | Facility: CLINIC | Age: 85
End: 2025-06-19
Payer: MEDICARE

## 2025-06-19 DIAGNOSIS — K21.00 GASTROESOPHAGEAL REFLUX DISEASE WITH ESOPHAGITIS WITHOUT HEMORRHAGE: ICD-10-CM

## 2025-06-19 DIAGNOSIS — R13.19 ESOPHAGEAL DYSPHAGIA: ICD-10-CM

## 2025-06-19 DIAGNOSIS — Z86.0101 HISTORY OF ADENOMATOUS POLYP OF COLON: ICD-10-CM

## 2025-06-19 PROCEDURE — 99213 OFFICE O/P EST LOW 20 MIN: CPT | Performed by: INTERNAL MEDICINE

## 2025-06-19 RX ORDER — ESOMEPRAZOLE MAGNESIUM 40 MG
TAKE 1 CAPSULE BY ORAL ROUTE DAILY FOR 90 DAYS CAPSULE,DELAYED RELEASE (ENTERIC COATED) ORAL 1
Qty: 90 | Refills: 3 | Status: ON HOLD | COMMUNITY
Start: 2019-10-25

## 2025-06-19 RX ORDER — OLMESARTAN MEDOXOMIL 5 MG/1
AS DIRECTED TABLET, FILM COATED ORAL
Status: ACTIVE | COMMUNITY

## 2025-06-19 RX ORDER — ESOMEPRAZOLE MAGNESIUM 40 MG/1
1 CAPSULE CAPSULE, DELAYED RELEASE PELLETS ORAL ONCE A DAY
Qty: 90 CAPSULE | Refills: 3 | OUTPATIENT
Start: 2025-06-16

## 2025-06-19 RX ORDER — ESOMEPRAZOLE MAGNESIUM 40 MG/1
1 CAPSULE CAPSULE, DELAYED RELEASE PELLETS ORAL ONCE A DAY
Qty: 90 CAPSULE | Refills: 3 | Status: ACTIVE | COMMUNITY
Start: 2025-03-22

## 2025-06-19 NOTE — HPI-TODAY'S VISIT:
Mr. Pinedo is an 84-year-old white male who presents to the office today with complaint of suspected rectal prolapse vs prolapsing hemorrhoids, last seen for follow-up April 18, 2024.  Earlier this year, patient did have an EGD for evaluation of reflux and dysphagia.  No findings to explain dysphagia during EGD March 13, 2024, but patient empirically dilated to 54 Jamaican.  Patchy gastritis noted.  Reactive gastropathy, per pathology.  No H. pylori or dysplasia.  Back in 2022, he had a colonoscopy where two, small tubular adenomas removed from the transverse colon and sigmoid colon.  At that time he had also had another EGD where small bowel biopsies unremarkable, mild gastritis noted with no H. pylori and esophageal biopsies benign.  Patient has had multiple EGDs with dilation in the past due to recurrent dysphagia and has tolerated dilation well.  Before his recent colonoscopy, he had a normal exam in 2014.  Patient has been holding Xarelto for the last seven days. Patient reports some perianal and rectal pain in the last week, not improved with topical therapy.  Denies any strenuous activity, trauma, new medications or NSAID use while on Xarelto.  He has not let his cardiologist know that he has been off of the blood thinner but states that it has been debated between the original prescriber whether he needed to be on this long-term with pacemaker in place.  No nausea, vomiting, chest pain.  Had some lower abdominal pain but none currently.  He felt like the pain was radiating toward the tailbone and down mid thigh.  Patient denies any change in his medical history since last visit, trauma or prolonged sitting.  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  He has intentionally lost weight over the years.  Good appetite. Patient was seen by Dr. Dacosta  general surgery on 11/11/2024 to evaluate a resolving external hemorrhoid the assessment was that he was doing well and recommendation for warm soaks and hydrocortisone cream if needed. Patient is feeling well on daily Nexium 20 mg. Had one recent episode of dysphagia to solid food, none since. Last EGD was 11/26/19, showing mild gastritis, dilated to 48F Marrero.. Normal Abdominal ultrasound. He does have pacemaker, on Xarelto. Patient underwent his last colonoscopy on 8/4/2014 which was a screening exam sigmoid diverticulosis was found, the exam was otherwise unremarkable. Patient underwent lab work on 4/20/2022.  Serum ferritin was 18 serum iron was 50 TIBC 416 with a 12% saturation hematocrit 37.2 hemoglobin 12.6 WBC 5.7 Patient returns for follow-up on 5/17/2022.  He is again complaining of dysphagia to solid food, with food temporarily lodging in his retrosternal region.  He underwent his last EGD with esophageal dilation on 11/26/2019.  He denies any abdominal pain, his stools have been normal in consistency has had no unexplained weight loss recent lab work does show iron deficiency anemia with serum ferritin of 18 serum iron 50 TIBC 416 with a 12% saturation.  Hematocrit 37.2 hemoglobin 12.6.  Stool testing was Hemoccult negative.  Patient underwent his last screening colonoscopy on 8/4/2014 which showed only sigmoid diverticulosis.  He has been taking over-the-counter Nexium 20 mg daily which has been well controlling his reflux.  He does have a pacemaker and is on Xarelto, so procedures will need to be done at the hospital, and we will need to obtain cardiac clearance. Patient underwent colonoscopy and EGD by Dr. Liu on 8/2/2022.  The colonoscopy revealed a 5 mm polyp in the transverse colon which was removed and a 6 mm polyp in the sigmoid colon, also removed few diverticuli were seen, grade 1 internal hemorrhoids were noted.  The pathology of both these polyps were tubular adenoma.  Patient underwent EGD which showed grade B esophagitis which was biopsied a TTS balloon dilator 18 to 20 mm was passed diffuse mild gastritis was seen also biopsied duodenal biopsies were taken to evaluate for celiac disease.  Pathology revealed benign duodenal biopsies gastric biopsies showed mild gastritis distal esophageal biopsies revealed benign squamous mucosa. Patient underwent lab work on 1/24/2023 which included normal CBC with hematocrit 43.5 hemoglobin 14.3 WBC 6.1 and an essentially normal CMP. Patient is feeling well on daily Nexium 20 mg. Had one recent episode of dysphagia to solid food, none since. Last EGD was 11/26/19, showing mild gastritis, dilated to 48F Marrero.. Normal Abdominal ultrasound. He does have pacemaker, on Xarelto. Patient underwent his last colonoscopy on 8/4/2014 which was a screening exam sigmoid diverticulosis was found, the exam was otherwise unremarkable. Patient underwent lab work on 4/20/2022.  Serum ferritin was 18 serum iron was 50 TIBC 416 with a 12% saturation hematocrit 37.2 hemoglobin 12.6 WBC 5.7 Patient returns for follow-up on 5/17/2022.  He is again complaining of dysphagia to solid food, with food temporarily lodging in his retrosternal region.  He underwent his last EGD with esophageal dilation on 11/26/2019.  He denies any abdominal pain, his stools have been normal in consistency has had no unexplained weight loss recent lab work does show iron deficiency anemia with serum ferritin of 18 serum iron 50 TIBC 416 with a 12% saturation.  Hematocrit 37.2 hemoglobin 12.6.  Stool testing was Hemoccult negative.  Patient underwent his last screening colonoscopy on 8/4/2014 which showed only sigmoid diverticulosis.  He has been taking over-the-counter Nexium 20 mg daily which has been well controlling his reflux.  He does have a pacemaker and is on Xarelto, so procedures will need to be done at the hospital, and we will need to obtain cardiac clearance. Patient underwent colonoscopy and EGD by Dr. Liu on 8/2/2022.  The colonoscopy revealed a 5 mm polyp in the transverse colon which was removed and a 6 mm polyp in the sigmoid colon also removed with biopsy forceps.  Few left colonic diverticuli were seen and small internal hemorrhoids.  The pathology of the polyps were tubular adenoma.  Patient's EGD revealed grade B distal esophagitis biopsies showed benign inflammation TTS balloon dilator was passed to 20 mm mild gastritis was present biopsy showed benign inflammation duodenal biopsies were unremarkable. Patient returns for follow-up on 8/23/2022.  He has done well since his colonoscopy and EGD.  I carefully reviewed the findings with him, and following his esophageal dilation, he is now swallowing well he believes his reflux will be better controlled on prescription strength omeprazole 40 mg daily, rather than over-the-counter Nexium which she is now doing.  He denies any abdominal pain, his bowel movements have been normal he has had no unexplained weight loss and no overt GI bleeding as to adenomatous polyps were removed on his recent colonoscopy, due to his age, I believe we should schedule follow-up colonoscopy for surveillance in 3 years, assuming he is still in good health.  We will see him back in follow-up in 6 months. Patient returns for follow-up on 2/21/2023.  Overall he is doing very well from a GI standpoint.  He is having no significant swallowing issues, and his reflux has been well controlled on 20 mg daily of esomeprazole, and on occasion he will increase this dosing to 40 mg.  He denies abdominal pain and has had no unexplained weight loss he eats well with a good appetite, his bowel movements have been normal.  Recent lab work did show hematocrit 43.5 hemoglobin 14.3, as his iron deficiency anemia appears to have resolved.  We again discussed his last colonoscopy with 2 adenomatous polyps were removed.  Because of his age, would plan follow-up surveillance colonoscopy in 3 years, assuming at that time he is healthy enough to undergo the procedure. Patient presented in May 2023 with lower abdominal pain he underwent abdominal pelvic CT scan which was consistent with a mild sigmoid diverticulitis.  His amylase lipase CBC and CMP were all essentially within normal limits.  He subsequently improved, although experienced 1 episode of rectal bleeding. Patient returns for follow-up on 8/22/2023.  We discussed in detail his episode of mild diverticulitis that occurred in May 2023.  He did receive 2 rounds of antibiotic therapy, and his symptoms have completely resolved having no further abdominal pain and his bowel movements have been normal.  He has added Metamucil to his regimen, and has been drinking plenty of fluids.  He does experience intermittent lower abdominal discomfort which lasts only a very short time and resolves.  This is more consistent with a colonic spasm.  As it is short acting, there is no needed present to prescribe antispasmodic therapy.  He is due for follow-up colonoscopy in 3 years, with his history of adenomatous colon polyps.  He is taking daily esomeprazole 40 mg, which is controlling his reflux he is having some issues with mild dysphagia, but believes he is not yet needing another esophageal dilation. Patient underwent CBC and CMP on 2/1/2024, all was essentially normal including hematocrit 43.7 hemoglobin 14.3 Patient returns for follow-up on 2/13/2024.  His reflux has been under good control on daily esomeprazole therapy.  He is having issues of dysphagia to solid food, which has been occurring intermittently but more frequently, especially with meat.  He denies any abdominal pain, has noticed some back pain, likely musculoskeletal his bowel movements have been normal there is been no overt GI bleeding and no unexplained weight loss.  He does have a history of atrial fibrillation with a pacemaker, and is on Xarelto therapy.  We will obtain cardiac clearance for this procedure. Patient underwent EGD on 3/13/2024 there was no obvious endoscopic abnormality in the esophagus to explain his complaints of dysphagia we did perform Marrero dilation with a 50 and 54 Jamaican dilator.  Patchy mild gastritis was seen which was biopsied.  Pathology of the gastric biopsies revealed benign inflammation. Patient returns for follow-up on 4/18/2024.  He has done well since his recent EGD and esophageal dilation, and is now swallowing very well his reflux is well-controlled on esomeprazole 40 mg daily.  I reviewed the results of his recent endoscopy which showed patchy mild gastritis which was biopsied pathology of the gastric biopsies showed benign inflammation he was dilated with a 50 and 54 Jamaican Marrero dilator, which she is tolerated well.  He denies abdominal pain eats well with a good appetite and his bowel movements are normal.  For his history of colon polyps, we will plan follow-up colonoscopy in 1 year. Patient returns for follow-up on 11/19/2024.  He states that overall his external hemorrhoid, is responding slowly to generic RectiCare hydrocortisone cream and soaking with warm water.  He has seen Dr. Dacosta, who recommended this conservative approach, with patient not requiring surgery at this time.  Overall he is feeling better he denies bleeding and his bowel movements have been normal he denies abdominal pain his heartburn and reflux have been well-controlled on daily esomeprazole therapy he does have brief episodes of reflux induced esophageal spasm he denies any significant dysphagia at this time.  He has a history of colon polyps, and is due for follow-up surveillance colonoscopy next year.             Labwork from 2/7/25 included a normal CBC and CMP. Patient returns for follow-up on 3/25/2025.  He has been complaining of increased indigestion and abdominal gas.  He is also having some lower abdominal discomfort which does improve passing flatus and with a bowel movement.  He is having mild episodes of dysphagia to solid food he had been taking Nexium 40 mg every morning and increase the dosing to an additional 20 mg before dinner he states this made him feel somewhat better.  Because of the chest discomfort and his history of atrial fibrillation he does have an appointment with cardiologist next week.  He has had episodes of chest discomfort accompanied by reflux.  He does have a history of colon polyps and is due for follow-up surveillance colonoscopy. Patient returns for followup on 6/19/2025.  Patient underwent EGD and colonoscopy on 5/2/2025 with a colonoscopy 2 small polyps were removed from the ascending colon sigmoid diverticulosis was present and overall the bowel prep was excellent pathology of the colon polyps were tubular adenoma the EGD revealed a normal esophagus which was dilated with Marrero dilators 48 and 50 Jamaican diffuse mild gastritis was found and biopsied pathology was consistent with chronic inactive gastritis. Overall patient is feeling very well from a GI standpoint.  He states that his swallowing has improved since his esophageal dilation, but he is careful with his diet and does chew his food well.  He denies any abdominal pain, and his bowel movements have been normal he states that his previous issue with external hemorrhoids has completely resolved.  I reviewed in detail the results of his recent EGD showing normal esophagus and mild gastritis and his colonoscopy where 2 small tubular adenomas were removed.  If he remains healthy we can consider follow-up surveillance colonoscopy in 5 years.